# Patient Record
Sex: MALE | Race: WHITE | NOT HISPANIC OR LATINO | Employment: OTHER | URBAN - METROPOLITAN AREA
[De-identification: names, ages, dates, MRNs, and addresses within clinical notes are randomized per-mention and may not be internally consistent; named-entity substitution may affect disease eponyms.]

---

## 2018-04-19 LAB
ALBUMIN SERPL BCP-MCNC: 3.9 G/DL (ref 3.5–5.7)
ALP SERPL-CCNC: 68 IU/L (ref 40–150)
ALT SERPL W P-5'-P-CCNC: 13 IU/L (ref 0–50)
AMYLASE (HISTORICAL): 29 U/L (ref 29–103)
ANION GAP SERPL CALCULATED.3IONS-SCNC: 13.3 MM/L
APTT PPP: 30.2 SEC (ref 24.4–37.6)
AST SERPL W P-5'-P-CCNC: 15 U/L (ref 8–27)
BASOPHILS # BLD AUTO: 0.1 X3/UL (ref 0–0.3)
BASOPHILS # BLD AUTO: 0.9 % (ref 0–2)
BILIRUB SERPL-MCNC: 0.8 MG/DL (ref 0.3–1)
BILIRUB UR QL STRIP: NEGATIVE
BUN SERPL-MCNC: 17 MG/DL (ref 7–25)
CALCIUM SERPL-MCNC: 9.4 MG/DL (ref 8.6–10.5)
CHLORIDE SERPL-SCNC: 99 MM/L (ref 98–107)
CLARITY UR: CLEAR
CO2 SERPL-SCNC: 28 MM/L (ref 21–31)
COLOR UR: ABNORMAL
CREAT SERPL-MCNC: 1.17 MG/DL (ref 0.7–1.3)
DEPRECATED RDW RBC AUTO: 14 % (ref 11.5–14.5)
EGFR (HISTORICAL): > 60 GFR
EGFR AFRICAN AMERICAN (HISTORICAL): > 60 GFR
EOSINOPHIL # BLD AUTO: 0.2 X3/UL (ref 0–0.5)
EOSINOPHIL NFR BLD AUTO: 2.7 % (ref 0–5)
GLUCOSE (HISTORICAL): 282 MG/DL (ref 65–99)
GLUCOSE UR STRIP-MCNC: ABNORMAL MG/DL
HCT VFR BLD AUTO: 45.7 % (ref 42–52)
HGB BLD-MCNC: 15.4 G/DL (ref 14–18)
HGB UR QL STRIP.AUTO: NEGATIVE
INR PPP: 0.98 (ref 0.9–1.5)
KETONES UR STRIP-MCNC: NEGATIVE MG/DL
LEUKOCYTE ESTERASE UR QL STRIP: NEGATIVE
LIPASE SERPL-CCNC: 39 U/L (ref 11–82)
LYMPHOCYTES # BLD AUTO: 1.5 X3/UL (ref 1.2–4.2)
LYMPHOCYTES NFR BLD AUTO: 25.6 % (ref 20.5–51.1)
MCH RBC QN AUTO: 27.1 PG (ref 26–34)
MCHC RBC AUTO-ENTMCNC: 33.6 G/DL (ref 31–36)
MCV RBC AUTO: 80.5 FL (ref 81–99)
MONOCYTES # BLD AUTO: 0.4 X3/UL (ref 0–1)
MONOCYTES NFR BLD AUTO: 7.6 % (ref 1.7–12)
NEUTROPHILS # BLD AUTO: 3.6 X3/UL (ref 1.4–6.5)
NEUTS SEG NFR BLD AUTO: 63.2 % (ref 42.2–75.2)
NITRITE UR QL STRIP: NEGATIVE
OSMOLALITY, SERUM (HISTORICAL): 284 MOSM (ref 262–291)
PH UR STRIP.AUTO: 5.5 [PH] (ref 4.5–8)
PLATELET # BLD AUTO: 193 X3/UL (ref 130–400)
PMV BLD AUTO: 8.5 FL (ref 8.6–11.7)
POTASSIUM SERPL-SCNC: 4.3 MM/L (ref 3.5–5.5)
PROT UR STRIP-MCNC: NEGATIVE MG/DL
PROTHROMBIN TIME (HISTORICAL): 11.4 SEC (ref 10.1–12.9)
RBC # BLD AUTO: 5.68 X6/UL (ref 4.3–5.9)
SODIUM SERPL-SCNC: 136 MM/L (ref 134–143)
SP GR UR STRIP.AUTO: <= 1.005 (ref 1–1.03)
TOTAL PROTEIN (HISTORICAL): 7.1 G/DL (ref 6.4–8.9)
UROBILINOGEN UR QL STRIP.AUTO: 0.2 EU/DL (ref 0.2–8)
WBC # BLD AUTO: 5.7 X3/UL (ref 4.8–10.8)

## 2019-07-22 ENCOUNTER — OFFICE VISIT (OUTPATIENT)
Dept: URGENT CARE | Facility: CLINIC | Age: 54
End: 2019-07-22
Payer: COMMERCIAL

## 2019-07-22 VITALS
TEMPERATURE: 98 F | RESPIRATION RATE: 18 BRPM | HEIGHT: 69 IN | DIASTOLIC BLOOD PRESSURE: 79 MMHG | OXYGEN SATURATION: 97 % | BODY MASS INDEX: 33.33 KG/M2 | HEART RATE: 77 BPM | SYSTOLIC BLOOD PRESSURE: 119 MMHG | WEIGHT: 225 LBS

## 2019-07-22 DIAGNOSIS — T14.8XXA PUNCTURE WOUND: Primary | ICD-10-CM

## 2019-07-22 PROCEDURE — 90715 TDAP VACCINE 7 YRS/> IM: CPT

## 2019-07-22 PROCEDURE — 99203 OFFICE O/P NEW LOW 30 MIN: CPT | Performed by: PHYSICIAN ASSISTANT

## 2019-07-22 RX ORDER — INSULIN GLARGINE 100 [IU]/ML
45 INJECTION, SOLUTION SUBCUTANEOUS DAILY
COMMUNITY

## 2019-07-22 RX ORDER — DOXYCYCLINE 100 MG/1
100 TABLET ORAL 2 TIMES DAILY
Qty: 14 TABLET | Refills: 0 | Status: SHIPPED | OUTPATIENT
Start: 2019-07-22 | End: 2019-07-29

## 2019-07-22 NOTE — PROGRESS NOTES
[facility]        NAME: Bryan Edmondson is a 49 y o  male  : 1965    MRN: [id number]  DATE: 2019  TIME: 11:29 AM    Assessment and Plan   Puncture wound [T14  8XXA]  1  Puncture wound  TDAP Vaccine greater than or equal to 8yo    doxycycline (ADOXA) 100 MG tablet         Patient Instructions     TDAP updated  Due to DM II, will treat with prophylactic abx  Instructed pt to watch for signs of infection and proceed to ED if any redness, warmth, swelling, pain occurs  Follow up with PCP in 3-5 days  Proceed to  ER if symptoms worsen  Chief Complaint     Chief Complaint   Patient presents with    Puncture Wound     stepped on nail today, puncture wound right heeel         History of Present Illness       50 y/o M presents for eval of puncture wound of L heel onset PTA  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 which penetrated his shoe and punctured his heel approx 1/4 inch  He states he is able to ambulate and is only in minor pain  Needs updated tetanus  Review of Systems   Review of Systems   All other systems reviewed and are negative  Current Medications       Current Outpatient Medications:     insulin glargine (LANTUS) 100 units/mL subcutaneous injection, Inject 32 Units under the skin daily at bedtime, Disp: , Rfl:     doxycycline (ADOXA) 100 MG tablet, Take 1 tablet (100 mg total) by mouth 2 (two) times a day for 7 days, Disp: 14 tablet, Rfl: 0    Current Allergies     Allergies as of 2019    (No Known Allergies)            The following portions of the patient's history were reviewed and updated as appropriate: allergies, current medications, past family history, past medical history, past social history, past surgical history and problem list      Past Medical History:   Diagnosis Date    Diabetes mellitus ([facility] [address] )        History reviewed  No pertinent surgical history  No family history on file        Medications have been verified  Objective   /79   Pulse 77   Temp 98 °F (36 7 °C) (Tympanic)   Resp 18   Ht 5' 9" (1 753 m)   Wt 102 kg (225 lb)   SpO2 97%   BMI 33 23 kg/m²        Physical Exam     Physical Exam   Constitutional: He is oriented to person, place, and time  He appears well-developed and well-nourished  No distress  HENT:   Head: Normocephalic and atraumatic  Right Ear: Tympanic membrane, external ear and ear canal normal    Left Ear: Tympanic membrane, external ear and ear canal normal    Nose: Nose normal    Mouth/Throat: Uvula is midline, oropharynx is clear and moist and mucous membranes are normal    Eyes: Pupils are equal, round, and reactive to light  Conjunctivae and EOM are normal    Cardiovascular: Normal rate, regular rhythm and normal heart sounds  Exam reveals no gallop  No murmur heard  Pulmonary/Chest: Effort normal and breath sounds normal  No accessory muscle usage  No respiratory distress  He has no wheezes  He has no rhonchi  He has no rales  Musculoskeletal:   L heel small puncture wound without surrounding erythema  No debris, no foreign body  FROM of all toes and ankle  Sensation intact   Lymphadenopathy:     He has no cervical adenopathy  Neurological: He is alert and oriented to person, place, and time  No cranial nerve deficit  Skin: Skin is warm, dry and intact  No rash noted  Psychiatric: He has a normal mood and affect  Nursing note and vitals reviewed

## 2019-12-03 ENCOUNTER — OFFICE VISIT (OUTPATIENT)
Dept: URGENT CARE | Facility: CLINIC | Age: 54
End: 2019-12-03
Payer: COMMERCIAL

## 2019-12-03 VITALS
RESPIRATION RATE: 20 BRPM | BODY MASS INDEX: 36.56 KG/M2 | OXYGEN SATURATION: 98 % | WEIGHT: 246.8 LBS | HEIGHT: 69 IN | TEMPERATURE: 97.3 F | SYSTOLIC BLOOD PRESSURE: 114 MMHG | DIASTOLIC BLOOD PRESSURE: 69 MMHG | HEART RATE: 89 BPM

## 2019-12-03 DIAGNOSIS — S91.002A ANKLE WOUND, LEFT, INITIAL ENCOUNTER: Primary | ICD-10-CM

## 2019-12-03 PROCEDURE — 99213 OFFICE O/P EST LOW 20 MIN: CPT | Performed by: PHYSICIAN ASSISTANT

## 2019-12-03 RX ORDER — CEPHALEXIN 500 MG/1
500 CAPSULE ORAL 4 TIMES DAILY
Qty: 28 CAPSULE | Refills: 0 | Status: SHIPPED | OUTPATIENT
Start: 2019-12-03 | End: 2019-12-10

## 2019-12-03 NOTE — PATIENT INSTRUCTIONS
Acute Wound Care   AMBULATORY CARE:   An acute wound  is an injury that causes a break in the skin  An acute wound can happen suddenly, last a short time, and may heal on its own  Common signs and symptoms of an acute wound:   · A cut, tear, or gash in your skin    · Bleeding, swelling, pain, or trouble moving the affected area    · Dirt or foreign objects inside the wound     · Milky, yellow, green, or brown pus in the wound     · Red, tender, or warm area around the pus    · Fever  Seek care immediately if:   · You have pus or a foul odor coming from the wound  · You have sudden trouble breathing or chest pain  · Blood soaks through your bandage  Contact your healthcare provider if:   · You have muscle, joint, or body aches, sweating, or a fever  · You have more swelling, redness, or bleeding in your wound  · Your skin is itchy, swollen, or you have a rash  · You have questions or concerns about your condition or care  Treatment for an acute wound  may include any of the following:  · Cleansing  is done with soap and water to wash away germs and decrease the risk of infection  Sterile water further cleans the wound  The cleaning is done under high pressure with a catheter tip and large syringe  A solution that kills germs may also be used  · Debridement  is done to clean and remove objects, dirt, or dead tissues from the open wound  Healthcare providers may also drain the wound to clean out pus  · Closure of the wound  is done with stitches, staples, skin adhesive, or other treatments  This may be done if the wound is wide or deep  Stitches may be needed if the wound is in an area that moves a lot, such as the hands, feet, and joints  Stitches may help to keep the wound from getting infected  They may also decrease the amount of scarring you have  Some wounds may heal better without stitches    Wound care:   · If your wound was closed with thin strips of medical tape, keep them clean and dry  The strips of medical tape will fall off on their own  Do not pull them off  · Keep the bandage clean and dry  Do not remove the bandage over your wound unless your healthcare provider says it is okay  · Wash your hands before and after you take care of your wound to prevent infection  · Clean the wound as directed  If you cannot reach the wound, have someone help you  · If you have packing, make sure all the gauze used to pack the wound is taken out and replaced as directed  Keep track of how many gauze dressings are placed inside the wound  Follow up with your healthcare provider as directed:  Write down your questions so you remember to ask them during your visits  © 2016 [employer] is for End User's use only and may not be sold, redistributed or otherwise used for commercial purposes  All illustrations and images included in CareNotes® are the copyrighted property of A [employer] M [employer]  or [employer] R[employer]  The above information is an  only  It is not intended as medical advice for individual conditions or treatments  Talk to your doctor, nurse or pharmacist before following any medical regimen to see if it is safe and effective for you

## 2019-12-03 NOTE — PROGRESS NOTES
[facility]        NAME: Desiree Kinsey is a 49 y o  male  : 1965    MRN: [id number]  DATE: December 3, 2019  TIME: 10:31 AM    Assessment and Plan   Ankle wound, left, initial encounter [S91 002A]  1  Ankle wound, left, initial encounter  cephalexin (KEFLEX) 500 mg capsule         Patient Instructions     Start Keflex as prescribed  Follow up with PCP tomorrow as scheduled for referral to wound care  Proceed to  ER if symptoms worsen  Chief Complaint     Chief Complaint   Patient presents with    Leg Injury     c/o left lower leg injury, pt struck his leg on a push cart last Tuesday  Open wound with redness noted to LLE  History of Present Illness       Patient presents with a one-week history of wound to the left anterior ankle  Patient states he may have struck his foot with a cart but is unsure  The wound is not healing and has increased of redness surrounding the wound  Denies any drainage from the wound, fever or chills  Patient is diabetic  Review of Systems   Review of Systems   Constitutional: Negative for chills and fever  Gastrointestinal: Negative for nausea and vomiting  Musculoskeletal: Positive for myalgias  Skin: Positive for wound  Neurological: Negative for weakness and numbness  Hematological: Negative for adenopathy           Current Medications       Current Outpatient Medications:     insulin glargine (LANTUS) 100 units/mL subcutaneous injection, Inject 32 Units under the skin daily at bedtime, Disp: , Rfl:     LISINOPRIL PO, Take by mouth, Disp: , Rfl:     metFORMIN (GLUCOPHAGE) 500 mg tablet, Take 500 mg by mouth 2 (two) times a day with meals, Disp: , Rfl:     cephalexin (KEFLEX) 500 mg capsule, Take 1 capsule (500 mg total) by mouth 4 (four) times a day for 7 days, Disp: 28 capsule, Rfl: 0    Current Allergies     Allergies as of 2019    (No Known Allergies)            The following portions of the patient's history were reviewed and updated as appropriate: allergies, current medications, past family history, past medical history, past social history, past surgical history and problem list      Past Medical History:   Diagnosis Date    Diabetes mellitus ([facility] [address] )        History reviewed  No pertinent surgical history  History reviewed  No pertinent family history  Medications have been verified  Objective   /69 (BP Location: Left arm, Patient Position: Sitting, Cuff Size: Extra-Large)   Pulse 89   Temp (!) 97 3 °F (36 3 °C) (Tympanic)   Resp 20   Ht 5' 9" (1 753 m)   Wt 112 kg (246 lb 12 8 oz)   SpO2 98%   BMI 36 45 kg/m²        Physical Exam     Physical Exam   Constitutional: He is oriented to person, place, and time  He appears well-developed and well-nourished  HENT:   Head: Normocephalic and atraumatic  Neck: Normal range of motion  Cardiovascular: Normal rate and regular rhythm  Pulmonary/Chest: Effort normal    Neurological: He is alert and oriented to person, place, and time  Skin: Skin is warm and dry  4 5 cm wound left anterior ankle which appears to be full skin thickness  Wound is dry but there is a lot of surrounding erythema and tenderness  There is slight erythema extending into the dorsum of the foot  Psychiatric: He has a normal mood and affect  His behavior is normal    Nursing note and vitals reviewed

## 2020-07-30 ENCOUNTER — OFFICE VISIT (OUTPATIENT)
Dept: URGENT CARE | Facility: CLINIC | Age: 55
End: 2020-07-30
Payer: COMMERCIAL

## 2020-07-30 VITALS
OXYGEN SATURATION: 99 % | DIASTOLIC BLOOD PRESSURE: 73 MMHG | RESPIRATION RATE: 18 BRPM | BODY MASS INDEX: 36.43 KG/M2 | WEIGHT: 246 LBS | SYSTOLIC BLOOD PRESSURE: 148 MMHG | HEART RATE: 88 BPM | HEIGHT: 69 IN | TEMPERATURE: 98 F

## 2020-07-30 DIAGNOSIS — L72.3 INFLAMED SEBACEOUS CYST: Primary | ICD-10-CM

## 2020-07-30 PROCEDURE — S9088 SERVICES PROVIDED IN URGENT: HCPCS | Performed by: PHYSICIAN ASSISTANT

## 2020-07-30 PROCEDURE — 99213 OFFICE O/P EST LOW 20 MIN: CPT | Performed by: PHYSICIAN ASSISTANT

## 2020-07-30 PROCEDURE — 87070 CULTURE OTHR SPECIMN AEROBIC: CPT | Performed by: PHYSICIAN ASSISTANT

## 2020-07-30 PROCEDURE — 10060 I&D ABSCESS SIMPLE/SINGLE: CPT | Performed by: PHYSICIAN ASSISTANT

## 2020-07-30 PROCEDURE — 87205 SMEAR GRAM STAIN: CPT | Performed by: PHYSICIAN ASSISTANT

## 2020-07-30 RX ORDER — PIOGLITAZONEHYDROCHLORIDE 30 MG/1
30 TABLET ORAL DAILY
COMMUNITY
Start: 2020-06-17 | End: 2022-03-03

## 2020-07-30 NOTE — PATIENT INSTRUCTIONS
Epidermal Inclusion Cysts   WHAT YOU NEED TO KNOW:   Epidermal inclusion cysts are the most common skin cysts in adults  These cysts are usually round, firm lumps filled with a cheese-like material called keratin  They are also called epidermoid, keratin, or sebaceous cysts  They can be found almost anywhere on your body  The cysts are most common on the face, back, neck, chest, and around your ears  They can be caused by blocked hair follicle and oil gland ducts in your skin  Epidermal inclusion cysts may grow slowly but are not cancerous  DISCHARGE INSTRUCTIONS:   Follow up with your healthcare provider as directed:  Write down your questions so you remember to ask them at your visits  Medicines:   · Antibiotics  may be given to treat or prevent an infection  · Take your medicine as directed  Contact your healthcare provider if you think your medicine is not helping or if you have side effects  Tell him of her if you are allergic to any medicine  Keep a list of the medicines, vitamins, and herbs you take  Include the amounts, and when and why you take them  Bring the list or the pill bottles to follow-up visits  Carry your medicine list with you in case of an emergency  Contact your healthcare provider if:   · Your cyst becomes swollen, red, and painful  · Your cyst is large and leads to trouble moving or a deformed area  · You have questions or concerns about your condition or care  © 2017 [facility] Information is for End User's use only and may not be sold, redistributed or otherwise used for commercial purposes  All illustrations and images included in CareNotes® are the copyrighted property of [employer] M , Inc  or Kirk Rivas  The above information is an  only  It is not intended as medical advice for individual conditions or treatments   Talk to your doctor, nurse or pharmacist before following any medical regimen to see if it is safe and effective for you

## 2020-07-30 NOTE — PROGRESS NOTES
[facility]        NAME: Violet Ashley is a 48 y o  male  : 1965    MRN: [id number]  DATE: 2020  TIME: 12:32 PM    Assessment and Plan   Inflamed sebaceous cyst [L72 3]  1  Inflamed sebaceous cyst  Wound culture and Gram stain         Patient Instructions       Follow up with PCP in 3-5 days  Proceed to  ER if symptoms worsen  Chief Complaint     Chief Complaint   Patient presents with    Mass     has cyst on back for many years, became red and inflamed the last 2 days         History of Present Illness       Patient states he has a cyst on his back which has now become red and painful with past 2 days  He has had numerous cyst removed in the past   Denies any fever chills or drainage  Review of Systems   Review of Systems   Constitutional: Negative for chills and fever  Skin: Positive for wound  Hematological: Negative for adenopathy  Current Medications       Current Outpatient Medications:     insulin glargine (LANTUS) 100 units/mL subcutaneous injection, Inject 32 Units under the skin daily at bedtime, Disp: , Rfl:     LISINOPRIL PO, Take by mouth, Disp: , Rfl:     metFORMIN (GLUCOPHAGE) 500 mg tablet, Take 500 mg by mouth 2 (two) times a day with meals, Disp: , Rfl:     pioglitazone (ACTOS) 30 mg tablet, Take 30 mg by mouth daily, Disp: , Rfl:     Current Allergies     Allergies as of 2020    (No Known Allergies)            The following portions of the patient's history were reviewed and updated as appropriate: allergies, current medications, past family history, past medical history, past social history, past surgical history and problem list      Past Medical History:   Diagnosis Date    Diabetes mellitus ([facility] [address] )        History reviewed  No pertinent surgical history  History reviewed  No pertinent family history  Medications have been verified          Objective   /73   Pulse 88   Temp 98 °F (36 7 °C) (Tympanic)   Resp 18   Ht 5' 9" (1 753 m)   Wt 112 kg (246 lb)   SpO2 99%   BMI 36 33 kg/m²          Physical Exam     Physical Exam   Constitutional: He appears well-developed and well-nourished  HENT:   Head: Normocephalic and atraumatic  Cardiovascular: Normal rate and regular rhythm  Pulmonary/Chest: Effort normal    Neurological: He is alert  Skin: Skin is warm  2 cm area of erythema of the upper thoracic area the back  There is a central area of fluctuance  There is slight tenderness  No drainage  Psychiatric: He has a normal mood and affect  Nursing note and vitals reviewed  Incision and drain  Date/Time: 7/30/2020 12:29 PM  Performed by: Cherry Diaz PA-C  Authorized by: Cherry Diaz PA-C     Patient location: care now  Other Assisting Provider: Yes (comment) (nurse)    Consent:     Consent obtained:  Verbal    Consent given by:  Patient    Risks discussed:  Incomplete drainage, pain and bleeding    Alternatives discussed:  No treatment  Universal protocol:     Procedure explained and questions answered to patient or proxy's satisfaction: yes      Patient identity confirmed:  Verbally with patient  Location:     Type:  Cyst    Size:  2    Location:  Trunk    Trunk location:  Back  Pre-procedure details:     Skin preparation:  Betadine  Anesthesia (see MAR for exact dosages): Anesthesia method:  Local infiltration    Local anesthetic:  Lidocaine 1% w/o epi  Procedure details:     Complexity:  Simple    Needle aspiration: no      Incision types:  Stab incision    Scalpel blade:  11    Approach:  Puncture    Incision depth:  Subcutaneous    Wound management:  Probed and deloculated    Drainage:  Purulent    Drainage amount: Moderate    Wound treatment:  Wound left open    Packing materials:  None  Post-procedure details:     Patient tolerance of procedure:   Tolerated well, no immediate complications

## 2020-08-04 LAB
BACTERIA WND AEROBE CULT: ABNORMAL
GRAM STN SPEC: ABNORMAL

## 2021-03-23 ENCOUNTER — OFFICE VISIT (OUTPATIENT)
Dept: OBGYN CLINIC | Facility: CLINIC | Age: 56
End: 2021-03-23
Payer: COMMERCIAL

## 2021-03-23 ENCOUNTER — APPOINTMENT (OUTPATIENT)
Dept: RADIOLOGY | Facility: CLINIC | Age: 56
End: 2021-03-23
Payer: COMMERCIAL

## 2021-03-23 VITALS
SYSTOLIC BLOOD PRESSURE: 126 MMHG | WEIGHT: 259 LBS | DIASTOLIC BLOOD PRESSURE: 86 MMHG | HEART RATE: 68 BPM | HEIGHT: 69 IN | BODY MASS INDEX: 38.36 KG/M2

## 2021-03-23 DIAGNOSIS — M25.512 LEFT SHOULDER PAIN, UNSPECIFIED CHRONICITY: ICD-10-CM

## 2021-03-23 DIAGNOSIS — M25.512 LEFT SHOULDER PAIN, UNSPECIFIED CHRONICITY: Primary | ICD-10-CM

## 2021-03-23 DIAGNOSIS — M75.122 COMPLETE TEAR OF LEFT ROTATOR CUFF, UNSPECIFIED WHETHER TRAUMATIC: ICD-10-CM

## 2021-03-23 PROCEDURE — 99203 OFFICE O/P NEW LOW 30 MIN: CPT | Performed by: ORTHOPAEDIC SURGERY

## 2021-03-23 PROCEDURE — 73030 X-RAY EXAM OF SHOULDER: CPT

## 2021-03-23 NOTE — PROGRESS NOTES
Assessment/Plan:    No problem-specific Assessment & Plan notes found for this encounter  Diagnoses and all orders for this visit:    Left shoulder pain, unspecified chronicity  -     XR shoulder 2+ vw left; Future  -     MRI shoulder left wo contrast; Future    Complete tear of left rotator cuff, unspecified whether traumatic  -     MRI shoulder left wo contrast; Future           an MRI is ordered of his left shoulder to rule out a rotator cuff tear  Continue home exercise program   Continue stretching  Follow up once the MRI is complete  An injection was declined today  Subjective:      Patient ID: Fran Monsalve is a 48 y o  male  HPI      The patient has had progressive pain and left shoulder for the past several months if not years  He then stated he has had in issues with his left shoulder while playing basketball  He states of recent, he has trouble extending and abducting his arm past 90 degrees  He denies any numbness or tingling  He denies any neck pain  He has not had any x-rays    The following portions of the patient's history were reviewed and updated as appropriate: allergies, current medications, past family history, past medical history, past social history, past surgical history and problem list     Review of Systems   Constitutional: Negative for chills, fever and unexpected weight change  HENT: Negative for hearing loss, nosebleeds and sore throat  Eyes: Negative for pain, redness and visual disturbance  Respiratory: Negative for cough, shortness of breath and wheezing  Cardiovascular: Negative for chest pain, palpitations and leg swelling  Gastrointestinal: Negative for abdominal pain, nausea and vomiting  Endocrine: Negative for polydipsia and polyuria  Genitourinary: Negative for dysuria and hematuria  Musculoskeletal: Positive for arthralgias and myalgias  Negative for back pain, gait problem, joint swelling, neck pain and neck stiffness          As noted in HPI   Skin: Negative for rash and wound  Neurological: Negative for dizziness, numbness and headaches  Psychiatric/Behavioral: Negative for decreased concentration and suicidal ideas  The patient is not nervous/anxious  Objective:      /86 (BP Location: Right arm, Patient Position: Sitting, Cuff Size: Large)   Pulse 68   Ht 5' 9" (1 753 m)   Wt 117 kg (259 lb)   BMI 38 25 kg/m²          Physical Exam       neck was soft and supple  There is a negative axial compression test in his neck  Left upper extremity is neurovascular intact  Fingers are pink and mobile  Compartments are soft  There is limited range of motion along his left shoulder with 90 degrees of forward flexion and abduction  Internal rotation to the sacrum  There is a positive drop-arm test   There is mild signs of impingement  No gross instability  Rotator cuff strength testing was 3-1/2 to 4/5        X-rays show subacromial spur

## 2021-03-30 ENCOUNTER — HOSPITAL ENCOUNTER (OUTPATIENT)
Dept: MRI IMAGING | Facility: HOSPITAL | Age: 56
Discharge: HOME | End: 2021-03-30
Attending: ORTHOPAEDIC SURGERY
Payer: COMMERCIAL

## 2021-03-30 DIAGNOSIS — M75.122 COMPLETE TEAR OF LEFT ROTATOR CUFF, UNSPECIFIED WHETHER TRAUMATIC: ICD-10-CM

## 2021-03-30 DIAGNOSIS — M25.512 LEFT SHOULDER PAIN, UNSPECIFIED CHRONICITY: ICD-10-CM

## 2021-03-30 PROCEDURE — 73221 MRI JOINT UPR EXTREM W/O DYE: CPT

## 2021-03-30 PROCEDURE — G1004 CDSM NDSC: HCPCS

## 2021-04-09 ENCOUNTER — OFFICE VISIT (OUTPATIENT)
Dept: OBGYN CLINIC | Facility: CLINIC | Age: 56
End: 2021-04-09
Payer: COMMERCIAL

## 2021-04-09 VITALS
SYSTOLIC BLOOD PRESSURE: 121 MMHG | BODY MASS INDEX: 38.36 KG/M2 | DIASTOLIC BLOOD PRESSURE: 78 MMHG | HEART RATE: 58 BPM | HEIGHT: 69 IN | WEIGHT: 259 LBS

## 2021-04-09 DIAGNOSIS — M25.512 LEFT SHOULDER PAIN, UNSPECIFIED CHRONICITY: ICD-10-CM

## 2021-04-09 DIAGNOSIS — M75.42 IMPINGEMENT SYNDROME OF LEFT SHOULDER: Primary | ICD-10-CM

## 2021-04-09 PROCEDURE — 99213 OFFICE O/P EST LOW 20 MIN: CPT | Performed by: ORTHOPAEDIC SURGERY

## 2021-04-09 PROCEDURE — 20610 DRAIN/INJ JOINT/BURSA W/O US: CPT | Performed by: ORTHOPAEDIC SURGERY

## 2021-04-09 RX ORDER — METHYLPREDNISOLONE ACETATE 40 MG/ML
2 INJECTION, SUSPENSION INTRA-ARTICULAR; INTRALESIONAL; INTRAMUSCULAR; SOFT TISSUE
Status: COMPLETED | OUTPATIENT
Start: 2021-04-09 | End: 2021-04-09

## 2021-04-09 RX ORDER — BUPIVACAINE HYDROCHLORIDE 2.5 MG/ML
4 INJECTION, SOLUTION INFILTRATION; PERINEURAL
Status: COMPLETED | OUTPATIENT
Start: 2021-04-09 | End: 2021-04-09

## 2021-04-09 RX ADMIN — METHYLPREDNISOLONE ACETATE 2 ML: 40 INJECTION, SUSPENSION INTRA-ARTICULAR; INTRALESIONAL; INTRAMUSCULAR; SOFT TISSUE at 08:37

## 2021-04-09 RX ADMIN — BUPIVACAINE HYDROCHLORIDE 4 ML: 2.5 INJECTION, SOLUTION INFILTRATION; PERINEURAL at 08:37

## 2021-04-09 NOTE — PROGRESS NOTES
ASSESSMENT/PLAN:    Diagnoses and all orders for this visit:    Impingement syndrome of left shoulder    Left shoulder pain, unspecified chronicity        48 y o  male with left shoulder impingement syndrome  Patient was offered a corticosteroid injection about the left shoulder  Risks and benefits of the injection were discussed at length  I discussed that the injection would be for both diagnostic and therapeutic purposes to determine if most of his discomfort is stemming from the bone spur  Patient tolerated the injection well  I will see him back in office in 6 weeks for a re-evaluation of the left shoulder  the patient presents for his MRI report of her left shoulder  Fortunately the rotator cuff was intact  He still complaining pain along the anterior aspect of his left shoulder  Physical exam shows forward flexion abduction to 110°  There is mild signs of impingement  No gross instability  Rotator cuff strength testing was intact  MRI results were discussed  Under aseptic technique, the left shoulder was injected with Depo-Medrol and Marcaine  He tolerated procedure quite well  Return back in 6 weeks for re-evaluation  If there are any problems sooner, he will not hesitate to let us now    Return in about 6 weeks (around 5/21/2021) for Recheck left shoulder        _____________________________________________________  CHIEF COMPLAINT:  Chief Complaint   Patient presents with    Left Shoulder - Follow-up         SUBJECTIVE:  Olga Medina is a 48 y o  male who presents to the office today for a follow up of his left shoulder pain  Patient received an MRI on 3/30/2021  Patient stated that he has pain with shoulder flexion past 90 degrees  Patient denied any numbness or tingling  Patient denied any new incidence of injury         The following portions of the patient's history were reviewed and updated as appropriate: allergies, current medications, past family history, past medical history, past social history, past surgical history and problem list     PAST MEDICAL HISTORY:  Past Medical History:   Diagnosis Date    Diabetes mellitus ([facility] [address] )        PAST SURGICAL HISTORY:  History reviewed  No pertinent surgical history  FAMILY HISTORY:  History reviewed  No pertinent family history  SOCIAL HISTORY:  Social History     Tobacco Use    Smoking status: Never Smoker    Smokeless tobacco: Never Used   Substance Use Topics    Alcohol use: Not Currently    Drug use: Never       MEDICATIONS:    Current Outpatient Medications:     insulin glargine (LANTUS) 100 units/mL subcutaneous injection, Inject 32 Units under the skin daily at bedtime, Disp: , Rfl:     LISINOPRIL PO, Take by mouth, Disp: , Rfl:     metFORMIN (GLUCOPHAGE) 500 mg tablet, Take 500 mg by mouth 2 (two) times a day with meals, Disp: , Rfl:     pioglitazone (ACTOS) 30 mg tablet, Take 30 mg by mouth daily, Disp: , Rfl:     ALLERGIES:  No Known Allergies    ROS:  Review of Systems   Constitutional: Negative for chills and fever  HENT: Negative for ear pain and sore throat  Eyes: Negative for pain and visual disturbance  Respiratory: Negative for cough and shortness of breath  Cardiovascular: Negative for chest pain and palpitations  Gastrointestinal: Negative for abdominal pain and vomiting  Genitourinary: Negative for dysuria and hematuria  Musculoskeletal: Positive for arthralgias  Negative for back pain  Skin: Negative for color change and rash  Neurological: Negative for seizures and syncope  All other systems reviewed and are negative  Constitutional: Negative for fatigue, fever or loss of appetite  HENT: Negative  Respiratory: Negative for shortness of breath, dyspnea  Cardiovascular: Negative for chest pain/tightness  Gastrointestinal: Negative for abdominal pain, N/V  Endocrine: Negative for cold/heat intolerance, unexplained weight loss/gain     Genitourinary: Negative for flank pain, dysuria, hematuria  Musculoskeletal: Positive for arthralgia   Skin: Negative for rash  Neurological: Negative for numbness or tingling  Psychiatric/Behavioral: Negative for agitation  _____________________________________________________  PHYSICAL EXAMINATION:    Blood pressure 121/78, pulse 58, height 5' 9" (1 753 m), weight 117 kg (259 lb)  Constitutional: Oriented to person, place, and time  Appears well-developed and well-nourished  No distress  HENT:   Head: Normocephalic  Eyes: Conjunctivae are normal  Right eye exhibits no discharge  Left eye exhibits no discharge  No scleral icterus  Cardiovascular: Normal rate  Pulmonary/Chest: Effort normal    Neurological: Alert and oriented to person, place, and time  Skin: Skin is warm and dry  No rash noted  Not diaphoretic  No erythema  No pallor  Psychiatric: Normal mood and affect  Behavior is normal  Judgment and thought content normal       MUSCULOSKELETAL EXAMINATION:   Physical Exam  Ortho Exam    Left Shoulder:   Skin intact   No obvious swelling   No erythema or ecchymosis   Compartments soft  Fingers pink and mobile   Limited ROM of the shoulder past 90 degrees of forward flexion   No gross instability   Neurovascularly intact     MRI of the left shoulder performed on 3/30/2021 demonstrates mild tendinosis of the long head of the biceps tendon, mild supraspinatus and infraspinatus tendinosis and subcortical cysts superiorly at the glenoid  Objective:  BP Readings from Last 1 Encounters:   04/09/21 121/78      Wt Readings from Last 1 Encounters:   04/09/21 117 kg (259 lb)        BMI:   Estimated body mass index is 38 25 kg/m² as calculated from the following:    Height as of this encounter: 5' 9" (1 753 m)  Weight as of this encounter: 117 kg (259 lb)  PROCEDURES PERFORMED:  Large joint arthrocentesis: L glenohumeral  Universal Protocol:  Consent: Verbal consent obtained    Risks and benefits: risks, benefits and alternatives were discussed  Consent given by: patient  Time out: Immediately prior to procedure a "time out" was called to verify the correct patient, procedure, equipment, support staff and site/side marked as required    Timeout called at: 4/9/2021 8:30 AM   Patient understanding: patient states understanding of the procedure being performed  Site marked: the operative site was marked  Patient identity confirmed: verbally with patient    Supporting Documentation  Indications: pain   Procedure Details  Location: shoulder - L glenohumeral  Preparation: Patient was prepped and draped in the usual sterile fashion  Needle size: 22 G  Ultrasound guidance: no  Approach: lateral  Medications administered: 2 mL methylPREDNISolone acetate 40 mg/mL; 4 mL bupivacaine 0 25 %    Patient tolerance: patient tolerated the procedure well with no immediate complications  Dressing:  Sterile dressing applied              Scribe Attestation    I,:  Sabrina Saunders am acting as a scribe while in the presence of the attending physician :       I,:  Jayden Martinez DO personally performed the services described in this documentation    as scribed in my presence :

## 2021-05-25 ENCOUNTER — OFFICE VISIT (OUTPATIENT)
Dept: OBGYN CLINIC | Facility: CLINIC | Age: 56
End: 2021-05-25
Payer: COMMERCIAL

## 2021-05-25 VITALS — BODY MASS INDEX: 38.36 KG/M2 | HEIGHT: 69 IN | WEIGHT: 259 LBS

## 2021-05-25 DIAGNOSIS — M75.42 IMPINGEMENT SYNDROME OF LEFT SHOULDER: Primary | ICD-10-CM

## 2021-05-25 PROCEDURE — 99213 OFFICE O/P EST LOW 20 MIN: CPT | Performed by: ORTHOPAEDIC SURGERY

## 2021-05-25 NOTE — PROGRESS NOTES
Assessment/Plan:    No problem-specific Assessment & Plan notes found for this encounter  Diagnoses and all orders for this visit:    Impingement syndrome of left shoulder           the patient is less symptomatic at this time  Additional injections and surgery were discussed but declined  He was instructed to continue home exercise program and stretch  Return back in 3 months for strength and motion check  If his condition changes, he will not hesitate to let us know    Subjective:      Patient ID: Monique Patel is a 55 y o  male  HPI     the patient has history of impingement of his left shoulder  He did have an injection the past   He states his pain has decreased albeit still present  He is sleeping better at night  He denies any numbness or tingling  He denies any fever chills  He denies any neck pain  The following portions of the patient's history were reviewed and updated as appropriate: allergies, current medications, past family history, past medical history, past social history, past surgical history and problem list     Review of Systems   Constitutional: Negative for chills, fever and unexpected weight change  HENT: Negative for hearing loss, nosebleeds and sore throat  Eyes: Negative for pain, redness and visual disturbance  Respiratory: Negative for cough, shortness of breath and wheezing  Cardiovascular: Negative for chest pain, palpitations and leg swelling  Gastrointestinal: Negative for abdominal pain, nausea and vomiting  Endocrine: Negative for polydipsia and polyuria  Genitourinary: Negative for dysuria and hematuria  Musculoskeletal: Positive for arthralgias and myalgias  Negative for back pain, gait problem, joint swelling, neck pain and neck stiffness  As noted in HPI   Skin: Negative for rash and wound  Neurological: Negative for dizziness, numbness and headaches     Psychiatric/Behavioral: Negative for decreased concentration and suicidal ideas  The patient is not nervous/anxious  Objective:      Ht 5' 9" (1 753 m)   Wt 117 kg (259 lb)   BMI 38 25 kg/m²          Physical Exam        Neck was soft and supple  There is a negative axial compression test is neck  Left upper extremity was neurovascular intact  Fingers are pink and mobile  Compartments are soft  Range of motion of his shoulder was improved 140° of flexion and abduction  Internal rotation to just above the level of the sacrum  There is minimal signs of impingement  No gross instability  Rotator cuff strength testing is grossly intact  A negative drop-arm test is present    Neurologically intact distally

## 2021-08-17 ENCOUNTER — PREP FOR PROCEDURE (OUTPATIENT)
Dept: OBGYN CLINIC | Facility: CLINIC | Age: 56
End: 2021-08-17

## 2021-08-17 ENCOUNTER — OFFICE VISIT (OUTPATIENT)
Dept: OBGYN CLINIC | Facility: CLINIC | Age: 56
End: 2021-08-17
Payer: COMMERCIAL

## 2021-08-17 VITALS
BODY MASS INDEX: 37.77 KG/M2 | SYSTOLIC BLOOD PRESSURE: 120 MMHG | HEIGHT: 69 IN | HEART RATE: 73 BPM | DIASTOLIC BLOOD PRESSURE: 84 MMHG | WEIGHT: 255 LBS

## 2021-08-17 DIAGNOSIS — M75.82 BONE SPUR OF ACROMIOCLAVICULAR JOINT, LEFT: ICD-10-CM

## 2021-08-17 DIAGNOSIS — M75.42 IMPINGEMENT SYNDROME OF LEFT SHOULDER: Primary | ICD-10-CM

## 2021-08-17 DIAGNOSIS — Z01.812 PRE-OPERATIVE LABORATORY EXAMINATION: ICD-10-CM

## 2021-08-17 PROCEDURE — 99213 OFFICE O/P EST LOW 20 MIN: CPT | Performed by: ORTHOPAEDIC SURGERY

## 2021-08-17 RX ORDER — CEFAZOLIN SODIUM 2 G/50ML
2000 SOLUTION INTRAVENOUS ONCE
Status: CANCELLED | OUTPATIENT
Start: 2022-04-20 | End: 2021-08-17

## 2021-08-17 RX ORDER — CHLORHEXIDINE GLUCONATE 4 G/100ML
SOLUTION TOPICAL DAILY PRN
Status: CANCELLED | OUTPATIENT
Start: 2021-08-17

## 2021-08-17 NOTE — PROGRESS NOTES
Assessment/Plan:  Assessment/Plan   Diagnoses and all orders for this visit:    Impingement syndrome of left shoulder    Bone spur of acromioclavicular joint, left       Discussed with patient that today's physical exam is consistent with rotator cuff impingement  Previous imaging demonstrates positive subacromial spur  Reviewed prognosis, procedure, benefits, and risks of left shoulder arthroscopy for subacromial decompression  Patient expresses understanding and detailed consent was obtained at time of visit for left shoulder arthroscopy  Surgery is tentatively scheduled for 1/5/2022  He will be seen postoperatively for follow-up  treatment options were discussed with the patient in great detail  He has opted for elective arthroscopy was left shoulder with possible open decompression and repair of knee would arise  All risks, complications, benefits were discussed with the patient in great detail including bleeding, infection, blood clots, pain, stiffness, neurovascular damage, fractures, dislocations, possibility of loss of life or limb for surgery, heart attack, stroke, etcetera  If there is a large rotator cuff tear which can not be repaired, he may have to live with a rotator cuff deficient shoulder  His surgery scheduled for January 5, 2022  Physical examination shows both primary secondary signs of impingement  Rotator cuff strength testing is grossly intact  There is a painful arc of motion along the anterior aspect of his left shoulder    Subjective:   Patient ID: Martha Franco is a 55 y o  male  HPI   patient returns for evaluation of left shoulder impingement  He was last seen in regards to this issue on 5/25/2021, at which time he was 6 weeks s/p corticosteroid injection  He states that he had no relief following the injection  He has been able to return to most activities of daily living, but continues to have symptom exacerbation with overhead motion    On today's presentation he reports feelings of crepitus and anterior shoulder pain  He denies any associated bruising, swelling, numbness, or tingling  He does report occasional feelings of weakness, but notes that this is mostly due to pain inhibition  As he had not had significant relief following the injection, he desires to discuss surgical options today  The following portions of the patient's history were reviewed and updated as appropriate: allergies, current medications, past family history, past medical history, past social history, past surgical history and problem list     Past Medical History:   Diagnosis Date    Diabetes mellitus ([facility] [address] )      History reviewed  No pertinent surgical history  History reviewed  No pertinent family history  Social History     Socioeconomic History    Marital status: /Civil Union     Spouse name: None    Number of children: None    Years of education: None    Highest education level: None   Occupational History    None   Tobacco Use    Smoking status: Never Smoker    Smokeless tobacco: Never Used   Vaping Use    Vaping Use: Never used   Substance and Sexual Activity    Alcohol use: Not Currently    Drug use: Never    Sexual activity: None   Other Topics Concern    None   Social History Narrative    None     Social Determinants of Health     Financial Resource Strain:     Difficulty of Paying Living Expenses:    Food Insecurity:     Worried About Running Out of Food in the Last Year:     Ran Out of Food in the Last Year:    Transportation Needs:     Lack of Transportation (Medical):      Lack of Transportation (Non-Medical):    Physical Activity:     Days of Exercise per Week:     Minutes of Exercise per Session:    Stress:     Feeling of Stress :    Social Connections:     Frequency of Communication with Friends and Family:     Frequency of Social Gatherings with Friends and Family:     Attends Mosque Services:     Active Member of Clubs or Organizations:     Attends Club or Organization Meetings:     Marital Status:    Intimate Partner Violence:     Fear of Current or Ex-Partner:     Emotionally Abused:     Physically Abused:     Sexually Abused:        Current Outpatient Medications:     insulin glargine (LANTUS) 100 units/mL subcutaneous injection, Inject 32 Units under the skin daily at bedtime, Disp: , Rfl:     LISINOPRIL PO, Take by mouth, Disp: , Rfl:     metFORMIN (GLUCOPHAGE) 500 mg tablet, Take 500 mg by mouth 2 (two) times a day with meals, Disp: , Rfl:     pioglitazone (ACTOS) 30 mg tablet, Take 30 mg by mouth daily, Disp: , Rfl:     No Known Allergies      Review of Systems   Constitutional: Negative for chills, fever and unexpected weight change  HENT: Negative for hearing loss, nosebleeds and sore throat  Eyes: Negative for pain, redness and visual disturbance  Respiratory: Negative for cough, shortness of breath and wheezing  Cardiovascular: Negative for chest pain, palpitations and leg swelling  Gastrointestinal: Negative for abdominal pain, nausea and vomiting  Endocrine: Negative for polydipsia and polyuria  Genitourinary: Negative for dysuria and hematuria  Musculoskeletal:        As noted in HPI   Skin: Negative for rash and wound  Neurological: Negative for dizziness, numbness and headaches  Psychiatric/Behavioral: Negative for decreased concentration and suicidal ideas  The patient is not nervous/anxious          Objective:  /84   Pulse 73   Ht 5' 9" (1 753 m)   Wt 116 kg (255 lb)   BMI 37 66 kg/m²     Ortho Exam   Left Shoulder -   No anatomical deformity  Skin is warm and dry to touch with no signs of erythema, ecchymosis, or infection  No soft tissue swelling or effusion noted  + palpable crepitus with passive motion  TTP over anterior acromian  ROM  FF 0-165, ABD 0-165, ER 0-80  MMT 4 5/5 rotator cuff strength  No glenohumeral instability appreciated on exam  +  Speed's  +  Hawkin's  - Broadwater's  -   Empty can  Demonstrates normal elbow, wrist, and finger motion  2+ distal radial pulse with brisk capillary refill to the fingers  Radial, median, and ulnar motor and sensory distributions intact  Sensation light touch intact distally      Physical Exam  Constitutional:       Appearance: He is well-developed  HENT:      Right Ear: External ear normal       Left Ear: External ear normal       Nose: Nose normal    Eyes:      Conjunctiva/sclera: Conjunctivae normal       Pupils: Pupils are equal, round, and reactive to light  Pulmonary:      Effort: Pulmonary effort is normal    Musculoskeletal:      Cervical back: Normal range of motion  Comments:  As noted in HPI   Skin:     General: Skin is warm and dry  Neurological:      Mental Status: He is alert and oriented to person, place, and time  Psychiatric:         Behavior: Behavior normal          Thought Content:  Thought content normal          Judgment: Judgment normal          Imaging:  No new imaging performed this visit    Scribe Attestation    I,:  Sherice Love am acting as a scribe while in the presence of the attending physician :       I,:  Alee Zimmerman DO personally performed the services described in this documentation    as scribed in my presence :

## 2021-08-18 ENCOUNTER — PREP FOR PROCEDURE (OUTPATIENT)
Dept: OBGYN CLINIC | Facility: CLINIC | Age: 56
End: 2021-08-18

## 2021-12-28 ENCOUNTER — TELEPHONE (OUTPATIENT)
Dept: OBGYN CLINIC | Facility: HOSPITAL | Age: 56
End: 2021-12-28

## 2021-12-29 ENCOUNTER — PREP FOR PROCEDURE (OUTPATIENT)
Dept: OBGYN CLINIC | Facility: CLINIC | Age: 56
End: 2021-12-29

## 2021-12-29 ENCOUNTER — TELEPHONE (OUTPATIENT)
Dept: OBGYN CLINIC | Facility: MEDICAL CENTER | Age: 56
End: 2021-12-29

## 2021-12-29 NOTE — PRE-PROCEDURE INSTRUCTIONS
Pre-Surgery Instructions:   Medication Instructions    insulin glargine (LANTUS) 100 units/mL subcutaneous injection Instructed patient per Anesthesia Guidelines   LISINOPRIL PO Instructed patient per Anesthesia Guidelines   metFORMIN (GLUCOPHAGE) 500 mg tablet Instructed patient per Anesthesia Guidelines   pioglitazone (ACTOS) 30 mg tablet Instructed patient per Anesthesia Guidelines  Instructed to take lantus insulin am of surgery per anesthesia guidelines

## 2022-01-03 ENCOUNTER — TELEPHONE (OUTPATIENT)
Dept: OBGYN CLINIC | Facility: CLINIC | Age: 57
End: 2022-01-03

## 2022-01-03 ENCOUNTER — TELEPHONE (OUTPATIENT)
Dept: OBGYN CLINIC | Facility: OTHER | Age: 57
End: 2022-01-03

## 2022-01-03 NOTE — TELEPHONE ENCOUNTER
Patient called in he would like to Postpone surgery at this time        He does not wish to reschedule at this time as he needs his ankle to heal     C/b # [phone]    Thank you

## 2022-01-12 ENCOUNTER — TELEPHONE (OUTPATIENT)
Dept: OBGYN CLINIC | Facility: CLINIC | Age: 57
End: 2022-01-12

## 2022-01-12 ENCOUNTER — TELEPHONE (OUTPATIENT)
Dept: OBGYN CLINIC | Facility: MEDICAL CENTER | Age: 57
End: 2022-01-12

## 2022-01-12 NOTE — TELEPHONE ENCOUNTER
Patient sees Dr Cirilo Weathers  Patient is looking to change the surgery date for 4/6/2022, cannot do this date  Please give him a call back to carmen Mandujano # [phone]

## 2022-03-01 ENCOUNTER — APPOINTMENT (OUTPATIENT)
Dept: RADIOLOGY | Facility: CLINIC | Age: 57
End: 2022-03-01
Payer: COMMERCIAL

## 2022-03-01 ENCOUNTER — OFFICE VISIT (OUTPATIENT)
Dept: URGENT CARE | Facility: CLINIC | Age: 57
End: 2022-03-01
Payer: COMMERCIAL

## 2022-03-01 VITALS
TEMPERATURE: 97.1 F | RESPIRATION RATE: 18 BRPM | SYSTOLIC BLOOD PRESSURE: 114 MMHG | OXYGEN SATURATION: 95 % | HEART RATE: 78 BPM | DIASTOLIC BLOOD PRESSURE: 59 MMHG

## 2022-03-01 DIAGNOSIS — S99.912A INJURY OF LEFT ANKLE, INITIAL ENCOUNTER: ICD-10-CM

## 2022-03-01 DIAGNOSIS — S99.912A INJURY OF LEFT ANKLE, INITIAL ENCOUNTER: Primary | ICD-10-CM

## 2022-03-01 PROCEDURE — 99213 OFFICE O/P EST LOW 20 MIN: CPT | Performed by: NURSE PRACTITIONER

## 2022-03-01 PROCEDURE — S9088 SERVICES PROVIDED IN URGENT: HCPCS | Performed by: NURSE PRACTITIONER

## 2022-03-01 PROCEDURE — 73610 X-RAY EXAM OF ANKLE: CPT

## 2022-03-01 RX ORDER — SIMVASTATIN 10 MG
10 TABLET ORAL EVERY EVENING
COMMUNITY
Start: 2022-01-17

## 2022-03-01 NOTE — PROGRESS NOTES
[address] [facility]        NAME: Ingrid Dickey is a 55 y o  male  : 1965    MRN: [id number]  DATE: 2022  TIME: 7:54 PM    Assessment and Plan   Injury of left ankle, initial encounter [D74 [id number]]  1  Injury of left ankle, initial encounter  XR ankle 3+ vw left    Ambulatory Referral to Orthopedic Surgery     Concern for healing fracture, placed in walking boot    Patient Instructions     Patient Instructions     Rest, ice, elevate  Wear boot as directed  Tylenol as needed for pain  If you develop any increased pain, swelling, numbness, tingling, or any new or concerning symptoms please return or proceed to ER  Follow up with pcp in 3-5 days  Ankle Fracture   WHAT YOU NEED TO KNOW:   An ankle fracture is a break in 1 or more of the bones in your ankle  DISCHARGE INSTRUCTIONS:   Call your local emergency number (911 in the [address] [place] [address],[address]) for any of the following:   · You feel lightheaded, short of breath, and have chest pain  · You cough up blood  Return to the emergency department if:   · Your leg feels warm, tender, and painful  It may look swollen and red  · Blood soaks through your bandage  · You have severe pain in your ankle  · Your cast feels too tight  · Your foot or toes are cold or numb  · Your foot or toenails turn blue or gray  Call your doctor if:   · Your splint feels too tight  · Your swelling has increased or returned  · You have a fever  · Your pain does not go away, even after treatment  · You have questions or concerns about your condition or care  Medicines: You may need any of the following:  · Acetaminophen  decreases pain and fever  It is available without a doctor's order  Ask how much to take and how often to take it  Follow directions  Read the labels of all other medicines you are using to see if they also contain acetaminophen, or ask your doctor or pharmacist  Acetaminophen can cause liver damage if not taken correctly   Do not use more than 4 grams (4,000 milligrams) total of acetaminophen in one day  · NSAIDs , such as ibuprofen, help decrease swelling, pain, and fever  This medicine is available with or without a doctor's order  NSAIDs can cause stomach bleeding or kidney problems in certain people  If you take blood thinner medicine, always ask your healthcare provider if NSAIDs are safe for you  Always read the medicine label and follow directions  · Prescription pain medicine  may be given  Ask your healthcare provider how to take this medicine safely  Some prescription pain medicines contain acetaminophen  Do not take other medicines that contain acetaminophen without talking to your healthcare provider  Too much acetaminophen may cause liver damage  Prescription pain medicine may cause constipation  Ask your healthcare provider how to prevent or treat constipation  · Take your medicine as directed  Contact your healthcare provider if you think your medicine is not helping or if you have side effects  Tell him or her if you are allergic to any medicine  Keep a list of the medicines, vitamins, and herbs you take  Include the amounts, and when and why you take them  Bring the list or the pill bottles to follow-up visits  Carry your medicine list with you in case of an emergency  Self-care:       · Rest  your ankle so that it can heal  Return to normal activities as directed  · Apply ice  on your ankle for 15 to 20 minutes every hour or as directed  Use an ice pack, or put crushed ice in a plastic bag  Cover it with a towel  Ice helps prevent tissue damage and decreases swelling and pain  · Use a support device,  such as a brace, cast, or splint to limit your movement and protect your ankle  You may need to use crutches to protect your ankle and decrease your pain as you move around  Do not remove your device and do not put weight on your injured ankle      · Elevate  your ankle above the level of your heart as often as you can  This will help decrease swelling and pain  Prop your ankle on pillows or blankets to keep it elevated comfortably  Splint and cast care:  Cover the splint or cast before you bathe so it does not get wet  Tape 2 plastic trash bags to your skin above the cast  Try to keep your ankle out of the water as much as possible  Follow up with your doctor in 1 to 2 days, or as directed: Your fracture may need to be reduced (bones pushed back into place) or you may need surgery  Write down your questions so you remember to ask them during your visits  © Copyright [employer] 2022 Information is for End User's use only and may not be sold, redistributed or otherwise used for commercial purposes  All illustrations and images included in CareNotes® are the copyrighted property of A D A M , Inc  or Cassy Vazquez  The above information is an  only  It is not intended as medical advice for individual conditions or treatments  Talk to your doctor, nurse or pharmacist before following any medical regimen to see if it is safe and effective for you  Follow up with PCP in 3-5 days  Proceed to  ER if symptoms worsen  Chief Complaint     Chief Complaint   Patient presents with   Oly Mcgill     stumbled while stepping off of a ladder and twisted his left ankle, happened around 7 weeks ago but never healed         History of Present Illness       Fall  Incident onset: 7 weeks ago  The fall occurred from a ladder (states that he stepped off a ladder and his left foot landed in a hole and he twisted left ankle)  The pain is moderate  The symptoms are aggravated by ambulation, pressure on injury and standing  Pertinent negatives include no fever, loss of consciousness, numbness or tingling  He has tried nothing for the symptoms  Denies any previous injury or trauma  States that pain and swelling is not improving      Review of Systems   Review of Systems   Constitutional: Negative for chills, diaphoresis, fatigue and fever  Respiratory: Negative  Cardiovascular: Negative  Musculoskeletal: Positive for arthralgias, gait problem and joint swelling  Negative for back pain, myalgias, neck pain and neck stiffness  Neurological: Negative for tingling, loss of consciousness, weakness and numbness  Current Medications       Current Outpatient Medications:     insulin glargine (LANTUS) 100 units/mL subcutaneous injection, Inject 45 Units under the skin daily  , Disp: , Rfl:     LISINOPRIL PO, Take by mouth in the morning Unsure of mg , Disp: , Rfl:     metFORMIN (GLUCOPHAGE) 500 mg tablet, Take 500 mg by mouth 2 (two) times a day with meals, Disp: , Rfl:     simvastatin (ZOCOR) 10 mg tablet, Take 10 mg by mouth every evening, Disp: , Rfl:     pioglitazone (ACTOS) 30 mg tablet, Take 30 mg by mouth daily, Disp: , Rfl:     Current Allergies     Allergies as of 03/01/2022    (No Known Allergies)            The following portions of the patient's history were reviewed and updated as appropriate: allergies, current medications, past family history, past medical history, past social history, past surgical history and problem list      Past Medical History:   Diagnosis Date    Diabetes mellitus ([facility] [address] )     History of sleeve gastrectomy     Hypertension     Left ankle swelling     states thinks he sprained- swelling x 2 wks - surgeon office notified    Left shoulder pain     Mild acid reflux     Neuropathy     in feet yao    Shoulder impingement, left        Past Surgical History:   Procedure Laterality Date    GASTRECTOMY SLEEVE LAPAROSCOPIC      LAPAROSCOPIC GASTRIC BANDING         No family history on file  Medications have been verified  Objective   /59   Pulse 78   Temp (!) 97 1 °F (36 2 °C)   Resp 18   SpO2 95%   No LMP for male patient  Physical Exam     Physical Exam  HENT:      Head: Normocephalic and atraumatic     Cardiovascular:      Rate and Rhythm: Normal rate and regular rhythm  Heart sounds: Normal heart sounds, S1 normal and S2 normal    Pulmonary:      Effort: Pulmonary effort is normal       Breath sounds: Normal breath sounds and air entry  Musculoskeletal:      Left ankle: Swelling and ecchymosis present  No deformity or lacerations  Tenderness present over the lateral malleolus and medial malleolus  No ATF ligament, AITF ligament, CF ligament, posterior TF ligament, base of 5th metatarsal or proximal fibula tenderness  Normal range of motion  Normal pulse  Skin:     General: Skin is warm and dry  Capillary Refill: Capillary refill takes less than 2 seconds

## 2022-03-01 NOTE — PATIENT INSTRUCTIONS
Rest, ice, elevate  Wear boot as directed  Tylenol as needed for pain  If you develop any increased pain, swelling, numbness, tingling, or any new or concerning symptoms please return or proceed to ER  Follow up with pcp in 3-5 days  Ankle Fracture   WHAT YOU NEED TO KNOW:   An ankle fracture is a break in 1 or more of the bones in your ankle  DISCHARGE INSTRUCTIONS:   Call your local emergency number (911 in the [address] [facility],[address]) for any of the following:   · You feel lightheaded, short of breath, and have chest pain  · You cough up blood  Return to the emergency department if:   · Your leg feels warm, tender, and painful  It may look swollen and red  · Blood soaks through your bandage  · You have severe pain in your ankle  · Your cast feels too tight  · Your foot or toes are cold or numb  · Your foot or toenails turn blue or gray  Call your doctor if:   · Your splint feels too tight  · Your swelling has increased or returned  · You have a fever  · Your pain does not go away, even after treatment  · You have questions or concerns about your condition or care  Medicines: You may need any of the following:  · Acetaminophen  decreases pain and fever  It is available without a doctor's order  Ask how much to take and how often to take it  Follow directions  Read the labels of all other medicines you are using to see if they also contain acetaminophen, or ask your doctor or pharmacist  Acetaminophen can cause liver damage if not taken correctly  Do not use more than 4 grams (4,000 milligrams) total of acetaminophen in one day  · NSAIDs , such as ibuprofen, help decrease swelling, pain, and fever  This medicine is available with or without a doctor's order  NSAIDs can cause stomach bleeding or kidney problems in certain people  If you take blood thinner medicine, always ask your healthcare provider if NSAIDs are safe for you   Always read the medicine label and follow directions  · Prescription pain medicine  may be given  Ask your healthcare provider how to take this medicine safely  Some prescription pain medicines contain acetaminophen  Do not take other medicines that contain acetaminophen without talking to your healthcare provider  Too much acetaminophen may cause liver damage  Prescription pain medicine may cause constipation  Ask your healthcare provider how to prevent or treat constipation  · Take your medicine as directed  Contact your healthcare provider if you think your medicine is not helping or if you have side effects  Tell him or her if you are allergic to any medicine  Keep a list of the medicines, vitamins, and herbs you take  Include the amounts, and when and why you take them  Bring the list or the pill bottles to follow-up visits  Carry your medicine list with you in case of an emergency  Self-care:       · Rest  your ankle so that it can heal  Return to normal activities as directed  · Apply ice  on your ankle for 15 to 20 minutes every hour or as directed  Use an ice pack, or put crushed ice in a plastic bag  Cover it with a towel  Ice helps prevent tissue damage and decreases swelling and pain  · Use a support device,  such as a brace, cast, or splint to limit your movement and protect your ankle  You may need to use crutches to protect your ankle and decrease your pain as you move around  Do not remove your device and do not put weight on your injured ankle  · Elevate  your ankle above the level of your heart as often as you can  This will help decrease swelling and pain  Prop your ankle on pillows or blankets to keep it elevated comfortably  Splint and cast care:  Cover the splint or cast before you bathe so it does not get wet  Tape 2 plastic trash bags to your skin above the cast  Try to keep your ankle out of the water as much as possible  Follow up with your doctor in 1 to 2 days, or as directed:   Your fracture may need to be reduced (bones pushed back into place) or you may need surgery  Write down your questions so you remember to ask them during your visits  © Copyright [employer] 2022 Information is for End User's use only and may not be sold, redistributed or otherwise used for commercial purposes  All illustrations and images included in CareNotes® are the copyrighted property of [employer]  or [facility] Sonja Gonzalez   The above information is an  only  It is not intended as medical advice for individual conditions or treatments  Talk to your doctor, nurse or pharmacist before following any medical regimen to see if it is safe and effective for you

## 2022-03-03 ENCOUNTER — OFFICE VISIT (OUTPATIENT)
Dept: OBGYN CLINIC | Facility: CLINIC | Age: 57
End: 2022-03-03
Payer: COMMERCIAL

## 2022-03-03 VITALS
DIASTOLIC BLOOD PRESSURE: 70 MMHG | SYSTOLIC BLOOD PRESSURE: 114 MMHG | WEIGHT: 263 LBS | HEIGHT: 69 IN | BODY MASS INDEX: 38.95 KG/M2 | HEART RATE: 80 BPM

## 2022-03-03 DIAGNOSIS — R52 PAIN AGGRAVATED BY WALKING: Primary | ICD-10-CM

## 2022-03-03 DIAGNOSIS — S99.912A INJURY OF LEFT ANKLE, INITIAL ENCOUNTER: ICD-10-CM

## 2022-03-03 PROCEDURE — 99214 OFFICE O/P EST MOD 30 MIN: CPT | Performed by: FAMILY MEDICINE

## 2022-03-03 NOTE — PROGRESS NOTES
[facility]  [address]  [facility] [id number] [phone]      Chief Complaint:  Chief Complaint   Patient presents with    Left Ankle - Pain       Vitals:  /70   Pulse 80   Ht 5' 9" (1 753 m)   Wt 119 kg (263 lb)   BMI 38 84 kg/m²     The following portions of the patient's history were reviewed and updated as appropriate: allergies, current medications, past family history, past medical history, past social history, past surgical history, and problem list       Subjective:   Patient ID: Santy Moore is a 55 y o  male  Here c/o L ankle pain  7 wks ago stepped off ladder into a hole and rolled his L ankle  Hurts to walk  Seen in [facility]- xr done  Boot given  Note reviewed  Elevated/ice/bandage  Rest when he cans  No pain meds  Pain moves around  Sharp pain  Better at rest       LEFT ANKLE     INDICATION:   [id number]: Unspecified injury of left ankle, initial encounter      COMPARISON:  None     VIEWS:  XR ANKLE 3+ VW LEFT   Images: 3     FINDINGS:     There is no acute fracture or dislocation  Narrowing of the tibiotalar joint      No lytic or blastic osseous lesion      There are atherosclerotic calcifications  Soft tissue swelling about the ankle  Soft tissues are otherwise unremarkable      IMPRESSION:     No acute osseous abnormality          Review of Systems   Constitutional: Negative for fatigue and fever  Respiratory: Negative for shortness of breath  Cardiovascular: Negative for chest pain  Gastrointestinal: Negative for abdominal pain and nausea  Genitourinary: Negative for dysuria  Musculoskeletal: Positive for arthralgias  Skin: Negative for rash and wound  Neurological: Negative for weakness and headaches  Objective:  Left Ankle Exam     Tenderness   The patient is experiencing tenderness in the deltoid and ATF  Range of Motion   The patient has normal left ankle ROM       Muscle Strength   The patient has normal left ankle strength  Comments:  Pain with plantarflexion/eversion  Neg cat/squeeze          Strength/Myotome Testing     Left Ankle/Foot   Normal strength      Physical Exam  Vitals and nursing note reviewed  Constitutional:       Appearance: Normal appearance  He is well-developed  HENT:      Head: Normocephalic  Mouth/Throat:      Mouth: Mucous membranes are moist    Eyes:      Extraocular Movements: Extraocular movements intact  Cardiovascular:      Rate and Rhythm: Normal rate and regular rhythm  Heart sounds: Normal heart sounds  Pulmonary:      Effort: Pulmonary effort is normal       Breath sounds: Normal breath sounds  Abdominal:      General: Bowel sounds are normal       Palpations: Abdomen is soft  Musculoskeletal:         General: Tenderness present  Normal range of motion  Cervical back: Normal range of motion  Skin:     General: Skin is warm and dry  Neurological:      General: No focal deficit present  Mental Status: He is alert and oriented to person, place, and time  Psychiatric:         Mood and Affect: Mood normal          Behavior: Behavior normal          Thought Content: Thought content normal          I have personally reviewed pertinent films in PACS and my interpretation is XR-  L ankle- no fx  Assessment/Plan:  Assessment/Plan   Diagnoses and all orders for this visit:    Pain aggravated by walking  -     MRI ankle/heel left  wo contrast; Future    Injury of left ankle, initial encounter  -     Ambulatory Referral to Orthopedic Surgery  -     MRI ankle/heel left  wo contrast; Future        Return for f/u after MRI L ankle, Recheck       Piedad Miranda MD

## 2022-03-03 NOTE — PATIENT INSTRUCTIONS
F/u after MRI  MRI- L ankle- L ankle pain/injury 7 wks ago  XR neg  Pain with ambulation  R/o talar dome fracture  Icing/heat/OTC pain meds as needed  Continue wearing boot  Recommend crutches- patient declines

## 2022-03-11 ENCOUNTER — HOSPITAL ENCOUNTER (OUTPATIENT)
Dept: MRI IMAGING | Facility: HOSPITAL | Age: 57
Discharge: HOME | End: 2022-03-11
Attending: FAMILY MEDICINE
Payer: COMMERCIAL

## 2022-03-11 DIAGNOSIS — S99.912A INJURY OF LEFT ANKLE, INITIAL ENCOUNTER: ICD-10-CM

## 2022-03-11 DIAGNOSIS — R52 PAIN AGGRAVATED BY WALKING: ICD-10-CM

## 2022-03-11 PROCEDURE — G1004 CDSM NDSC: HCPCS

## 2022-03-11 PROCEDURE — 73721 MRI JNT OF LWR EXTRE W/O DYE: CPT

## 2022-03-15 ENCOUNTER — OFFICE VISIT (OUTPATIENT)
Dept: OBGYN CLINIC | Facility: CLINIC | Age: 57
End: 2022-03-15
Payer: COMMERCIAL

## 2022-03-15 VITALS
BODY MASS INDEX: 38.95 KG/M2 | SYSTOLIC BLOOD PRESSURE: 114 MMHG | TEMPERATURE: 98.3 F | HEART RATE: 76 BPM | HEIGHT: 69 IN | OXYGEN SATURATION: 98 % | DIASTOLIC BLOOD PRESSURE: 64 MMHG | WEIGHT: 263 LBS

## 2022-03-15 DIAGNOSIS — S93.492D SPRAIN OF ANTERIOR TALOFIBULAR LIGAMENT OF LEFT ANKLE, SUBSEQUENT ENCOUNTER: Primary | ICD-10-CM

## 2022-03-15 DIAGNOSIS — S86.312A TEAR OF PERONEAL TENDON OF LEFT FOOT: ICD-10-CM

## 2022-03-15 DIAGNOSIS — S99.912D INJURY OF LEFT ANKLE, SUBSEQUENT ENCOUNTER: ICD-10-CM

## 2022-03-15 PROCEDURE — 99214 OFFICE O/P EST MOD 30 MIN: CPT | Performed by: FAMILY MEDICINE

## 2022-03-15 NOTE — PATIENT INSTRUCTIONS
F/u 2 wks  Begin physical therapy  Icing/heat/OTC pain meds as needed  Continue wearing boot at all times  Gentle range of motion exercises  4x daily  Ankle Exercises   AMBULATORY CARE:   What you need to know about ankle exercises: Ankle exercises help strengthen your ankle and improve its function after injury  These are beginning exercises  Ask your healthcare provider if you need to see a physical therapist for more advanced exercises  · Do these exercises 3 to 5 days a week , or as directed by your healthcare provider  Ask if you should perform the exercises on each ankle  · Do the exercises in the order that your healthcare provider recommends  This will help prevent swelling, chronic pain, and reinjury  Start with range of motion exercises  Then progress to strengthening exercises, and finally to balancing exercises  · Warm up before you do ankle exercises  Walk or ride a stationary bike for 5 to 10 minutes to prepare your ankle for movement  · Stop if you feel pain  It is normal to feel some discomfort at first  Regular exercise will help decrease your discomfort over time  How to perform range of motion exercises safely:  Begin with range of motion exercises to improve flexibility  Ask your healthcare provider when you can progress to strengthening exercises  · Ankle alphabet:  Sit on a chair so that your feet do not touch the floor  Use your big toe to write each letter of the alphabet  Use only your foot and ankle, and keep your movements small  Do 2 sets  · Calf stretches:      ? Sitting calf stretches with a towel:  Sit on the floor with both legs out straight in front of you  Loop a towel around the ball of your injured foot  Grasp the ends of the towel and pull it toward you  Keep your leg and back straight  Do not lean forward as you pull the towel  Hold for 30 seconds  Then relax for 30 seconds   Do 2 sets of 10          ? Standing calf stretches:  Stand facing a wall with the foot that is not injured forward and your knee slightly bent  Keep the leg with the injured foot straight and behind you with your toes pointed in slightly  With both heels flat on the floor, press your hips forward  Do not arch your back  Hold for 30 seconds, and then relax for 30 seconds  Do 2 sets of 10  Repeat with your leg bent  Do 2 sets of 10  How to perform strengthening exercises safely:  After you can perform range of motion exercises without pain, you may begin strengthening exercises  Ask your healthcare provider when you can progress to balancing exercises  · Ankle movement in 4 directions:  Sit on the floor with your legs straight in front of you  Keep your heels on the floor for support  ? Dorsiflexion:  Begin with your toes pointing straight up  Pull your toes toward your body  Slowly return to the starting position  Do 3 sets of 5      ? Plantar flexion:  Begin with your toes pointing straight up  Push your toes away from your body  Slowly return to the starting position  Do 3 sets of 5          ? Inversion:  Begin with your toes pointing straight up  Push your toes inward, toward each other  Slowly return to the starting position  Do 3 sets of 5      ? Eversion:  Begin with your toes pointing straight up  Push your toes outward, away from each other  Slowly return to the starting position  Do 3 sets of 5        · Toe curls with a towel:  Sit on a chair so that both of your feet are flat on the floor  Place a small towel on the floor in front of your injured foot  Grab the center of the towel with your toes and curl the towel toward you  Relax and repeat  Do 1 set of 5          · [place] pick-ups:  Sit on a chair so that both of your feet are flat on the floor  Place 20 marbles on the floor in front of your injured foot  Use your toes to  one marble at a time and place it into a bowl  Repeat until you have picked up all the marbles  Do 1 set       · Heel raises:      ? Single leg heel raises:  Stand with your weight evenly on both feet  Hold on to a chair or a wall for balance  Lift the foot that is not injured off the floor so all your weight is placed on your injured foot  Raise the heel of your injured foot as high as you can  Slowly lower your heel to the floor  Do 1 set of 10          ? Double leg heel raises:  Stand with your weight evenly on both feet  Hold on to a chair or a wall for balance  Raise both of your heels as high as you can  Slowly lower your heels to the floor  Do 1 set of 10  · Heel and toe walks:      ? Heel walks:  Begin in a standing position  Lift your toes off the floor and walk on your heels  Keep your toes lifted as high as possible  Do 2 sets of 10          ? Toe walks:  Begin in a standing position  Lift your heels off the floor and walk on the balls and toes of your feet  Keep your heels lifted as high as possible  Do 2 sets of 10  How to perform a balance exercise safely:  After you can perform strengthening exercises without pain, you may do this beginning balancing exercise  Ask your healthcare provider for more advanced balance exercises  · Single leg stance:  Stand with your weight evenly on both feet, or hold on to a chair or a wall  Do not lean to the side  Lift the foot that is not injured off the floor so all your weight is placed on your injured foot  Balance on your injured foot  Ask your healthcare provider how long to hold this position  Contact your healthcare provider if:   · Your pain becomes worse  · You have new pain  · You have questions or concerns about your condition, care, or exercise program     © Copyright [employer] 2022 Information is for End User's use only and may not be sold, redistributed or otherwise used for commercial purposes   All illustrations and images included in CareNotes® are the copyrighted property of A D A M [employer] Inc  or Cassy Vazquez  The above information is an  only  It is not intended as medical advice for individual conditions or treatments  Talk to your doctor, nurse or pharmacist before following any medical regimen to see if it is safe and effective for you

## 2022-03-15 NOTE — PROGRESS NOTES
[facility] [address] [facility]  [address]  [address] [place] [address] [phone]      Chief Complaint:  Chief Complaint   Patient presents with    Left Ankle - Follow-up       Vitals:  /64 (BP Location: Left arm, Patient Position: Sitting, Cuff Size: Large)   Pulse 76   Temp 98 3 °F (36 8 °C) (Tympanic)   Ht 5' 9" (1 753 m)   Wt 119 kg (263 lb)   SpO2 98%   BMI 38 84 kg/m²     The following portions of the patient's history were reviewed and updated as appropriate: allergies, current medications, past family history, past medical history, past social history, past surgical history, and problem list       Subjective:   Patient ID: Yessenia Flor is a 55 y o  male  Here for f/u  L ankle pain/MRI  Hurts to walk but less  Not wearing boot today  Wearing boot with prolonged walking  No pain meds  Sharp pain  Better at rest       MRI LEFT ANKLE WITHOUT CONTRAST     INDICATION:   R94 800A: Unspecified injury of left ankle, initial encounter  R52: Pain, unspecified      COMPARISON:  Left ankle radiographs 3/1/2022     TECHNIQUE:   MR sequences were obtained of the left ankle including: Localizers, coronal STIR, coronal T1, axial T2 fat sat, axial PD, sagittal T2 fat sat, sagittal T1 sequences were obtained       Gadolinium was not used      FINDINGS:     SUBCUTANEOUS TISSUES:  There is diffuse subcutaneous edema      JOINT EFFUSION:  None      TENDONS:  Achilles tendon: Normal   Peroneus brevis and longus: Longitudinal split tear of the peroneus brevis tendon  Intact peroneus longus tendon  Posterior tibialis, flexor digitorum longus, flexor hallucis longus: Normal   Anterior tibialis, extensor digitorum longus, extensor hallucis longus:  Normal      LIGAMENTS:  Lateral collateral ligament complex: The anterior talofibular ligament is torn, while the calcaneofibular and posterior talofibular ligaments are intact    Distal tibiofibular syndesmosis:  Normal   Medial collateral ligament complex:  Normal      PLANTAR FASCIA:  Normal      ARTICULAR SURFACES:  Moderate tibiotalar and mild subtalar osteoarthritis  Mild talonavicular and 1st tarsometatarsal osteoarthritis      SINUS TARSI:  Normal      TARSAL TUNNEL:  Unremarkable      BONES:  Subchondral marrow edema in the distal tibia, talar head, and talar dome related degenerative changes  Otherwise normal marrow signal   Specifically, no T1 hypointense fracture line      MUSCULATURE:  Diffuse T2 hyperintensity and fatty atrophy of the visualized intrinsic foot musculature, consistent with subacute-on-chronic denervation changes      IMPRESSION:     No acute fracture      Longitudinal split tear of the peroneus brevis tendon      Tear of the anterior talofibular ligament      Moderate tibiotalar osteoarthritis            Review of Systems   Constitutional: Negative for fatigue and fever  Respiratory: Negative for shortness of breath  Cardiovascular: Negative for chest pain  Gastrointestinal: Negative for abdominal pain and nausea  Genitourinary: Negative for dysuria  Musculoskeletal: Positive for arthralgias  Skin: Negative for rash and wound  Neurological: Negative for weakness and headaches  Objective:  Left Ankle Exam     Tenderness   The patient is experiencing tenderness in the ATF and deltoid  Range of Motion   The patient has normal left ankle ROM  Muscle Strength   The patient has normal left ankle strength  Strength/Myotome Testing     Left Ankle/Foot   Normal strength      Physical Exam  Constitutional:       Appearance: Normal appearance  He is normal weight  Eyes:      Extraocular Movements: Extraocular movements intact  Pulmonary:      Effort: Pulmonary effort is normal    Musculoskeletal:         General: Tenderness present  Cervical back: Normal range of motion  Skin:     General: Skin is warm and dry  Neurological:      General: No focal deficit present  Mental Status: He is alert and oriented to person, place, and time  Mental status is at baseline  Psychiatric:         Mood and Affect: Mood normal          Behavior: Behavior normal          Thought Content: Thought content normal          Judgment: Judgment normal          I have personally reviewed pertinent films in PACS and my interpretation is MR  L ankle- longitudinal split tear of peroneus brevis tendon/complete tear of ATFL  Assessment/Plan:  Assessment/Plan   There are no diagnoses linked to this encounter  No follow-ups on file       Tk Buchanan MD

## 2022-03-16 ENCOUNTER — EVALUATION (OUTPATIENT)
Dept: PHYSICAL THERAPY | Facility: CLINIC | Age: 57
End: 2022-03-16
Payer: COMMERCIAL

## 2022-03-16 DIAGNOSIS — S93.492D SPRAIN OF ANTERIOR TALOFIBULAR LIGAMENT OF LEFT ANKLE, SUBSEQUENT ENCOUNTER: Primary | ICD-10-CM

## 2022-03-16 DIAGNOSIS — S99.912D INJURY OF LEFT ANKLE, SUBSEQUENT ENCOUNTER: ICD-10-CM

## 2022-03-16 DIAGNOSIS — S86.312D PERONEAL TENDON TEAR, LEFT, SUBSEQUENT ENCOUNTER: ICD-10-CM

## 2022-03-16 PROCEDURE — 97110 THERAPEUTIC EXERCISES: CPT | Performed by: PHYSICAL THERAPIST

## 2022-03-16 PROCEDURE — 97112 NEUROMUSCULAR REEDUCATION: CPT | Performed by: PHYSICAL THERAPIST

## 2022-03-16 PROCEDURE — 97162 PT EVAL MOD COMPLEX 30 MIN: CPT | Performed by: PHYSICAL THERAPIST

## 2022-03-16 NOTE — PROGRESS NOTES
PT Evaluation     Today's date: 3/16/2022  Patient name: Violet Coppola  : 1965  MRN: [id number]  Referring provider: Bryan Mendez MD  Dx:   Encounter Diagnosis     ICD-10-CM    1  Sprain of anterior talofibular ligament of left ankle, subsequent encounter  S93 492D    2  Injury of left ankle, subsequent encounter  S99 912D    3  Peroneal tendon tear, left, subsequent encounter  S86 312D                   Assessment  Assessment details: Violet Coppola is a 55 y o  male with a history of DM, Hx sleeve gastrectomy, HTN, GERD, neuropathy, and L shoulder impingement that presents for a moderate complexity physical therapy initial evaluation  The patient demonstrates signs and symptoms consistent with left ankle ATFL sprain, L peroneal tendon tear, and L ankle injury  During the examination the patient demonstrated decreased L ankle strength, decreased L ankle ROM, gait dysfunction, and L ankle pain  The patient's impairments are causing the following functional limitations: difficulty with prolonged standing, prolonged walking, walking on unlevel surfaces, and difficulty transferring from low surfaces  The patient's clinical presentation is evolving due to a number of participation restrictions, significant medial history, and functional limitation (FOTO 59% function)  The patient will benefit from skilled PT services to address impairments, work towards goals, and restore PLOF  Impairments: abnormal gait, abnormal or restricted ROM, activity intolerance, impaired balance, impaired physical strength, lacks appropriate home exercise program, pain with function and weight-bearing intolerance  Functional limitations: difficulty with prolonged standing, prolonged walking, walking on unlevel surfaces, and difficulty transferring from low surfaces  Symptom irritability: moderateUnderstanding of Dx/Px/POC: fair   Prognosis: fair    Goals  STG: Achieve in 4-6 weeks  1    Patient's L ankle pain at worst less than 2/10 to allow for proper gait  2   Patient's L ankle ROM improve by 10-15 degrees to improve prolonged ambulation  3   L LE MMT improve to > 4+/5 all motions tested to improve walking tolerance/recreational activities  LTG:  Achieve in 6-12 weeks  1  Patient's ankle FOTO score improve to  > 73% to indicate a return to PLOF  2   Patient achieve personal goal of decreasing L ankle pain to less than 2/10 with all activities  3  Patient to achieve independence with home exercise plan  4  Patient single leg stand on L LE > 30 seconds without LOB to indicate a return of normal balance  Plan  Plan details: RE-ASSESS 1X/MONTH  Patient would benefit from: skilled physical therapy  Planned modality interventions: TENS, cryotherapy, thermotherapy: hydrocollator packs and ultrasound  Other planned modality interventions: IASTM  Planned therapy interventions: joint mobilization, manual therapy, massage, neuromuscular re-education, patient education, postural training, self care, strengthening, stretching, therapeutic activities, therapeutic exercise, home exercise program, abdominal trunk stabilization, balance, balance/weight bearing training and gait training  Frequency: 1-3X/WK  Duration in weeks: 12  Plan of Care beginning date: 3/16/2022  Plan of Care expiration date: 6/15/2022  Treatment plan discussed with: PTA and patient        Subjective Evaluation    History of Present Illness  Date of onset: 1/12/2022  Mechanism of injury: Anthony Self is a 55 y o  male that presents to outpatient physical therapy with complaints of left ankle pain  The patient reports onset 8 weeks ago via inversion ankle sprain after stepping in a hole on the floor causing the ankle to roll  The patient attempted rest and elevation x 6 weeks without much change  The pain persisted so the patient sought medical care    The patient saw orthopedics who ordered a L ankle MRI which revealed a sprained ATFL, and torn peroneal tendons  The patient was prescribed a cam boot for the next 2 weeks followed by transition to an ankle support  The patient was referred to outpatient PT  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 - he notes limitation with prolonged walking, standing, difficulty walking on unlevel ground, difficulty performing work tasks  The patient's main goal for physical therapy is to have less pain at the left ankle  MRI L ankle : IMPRESSION:     No acute fracture      Longitudinal split tear of the peroneus brevis tendon      Tear of the anterior talofibular ligament      Moderate tibiotalar osteoarthritis  Pain  Current pain ratin  At best pain rating: 3  At worst pain ratin  Location: L anterior ankle joint line, medial ankle  Quality: sharp, discomfort and pulling  Relieving factors: rest  Aggravating factors: standing, walking and running  Progression: improved    Social Support  Steps to enter house: yes  1  Lives in: [facility]    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[employer])  Hand dominance: right      Diagnostic Tests  MRI studies: abnormal  Treatments  Current treatment: physical therapy  Patient Goals  Patient goals for therapy: decreased pain, improved balance, increased motion, increased strength, independence with ADLs/IADLs, return to sport/leisure activities and decreased edema  Patient goal: decrease pain        Objective     Palpation     Additional Palpation Details  NO TTP    Tenderness   Left Ankle/Foot   Tenderness in the anterior ankle, anterior talofibular ligament, lateral malleolus and medial malleolus  No tenderness in the Achilles insertion, calcaneofibular ligament and plantar fascia       Neurological Testing     Sensation     Ankle/Foot   Left Ankle/Foot   Intact: light touch    Right Ankle/Foot   Intact: light touch     Active Range of Motion   Left Ankle/Foot   Dorsiflexion (ke): 6 degrees   Dorsiflexion (kf): 6 degrees   Plantar flexion: 30 degrees   Inversion: 22 degrees   Eversion: 6 degrees   Great toe flexion: WFL  Great toe extension: WFL  Lesser toes: WFL    Right Ankle/Foot   Dorsiflexion (ke): 12 degrees   Dorsiflexion (kf): 16 degrees   Plantar flexion: 50 degrees   Inversion: 34 degrees   Eversion: 32 degrees   Great toe flexion: WFL  Great toe extension: WFL  Lesser toes: WFL    Passive Range of Motion   Left Ankle/Foot    Dorsiflexion (ke): 6 degrees   Dorsiflexion (kf): 6 degrees   Plantar flexion: 31 degrees   Inversion: 22 degrees with pain  Eversion: 7 degrees with pain    Right Ankle/Foot    Dorsiflexion (ke): 14 degrees   Dorsiflexion (kf): 18 degrees    Plantar flexion: 50 degrees   Inversion: 34 degrees   Eversion: 34 degrees   Great toe flexion: WFL  Great toe extension: WFL  Lesser toes: WFL    Strength/Myotome Testing     Left Ankle/Foot   Dorsiflexion: 3+  Plantar flexion: 3+  Inversion: 3+  Eversion: 3+  Great toe flexion: 3+  Great toe extension: 3+    Right Ankle/Foot   Dorsiflexion: 5  Plantar flexion: 5  Inversion: 5  Eversion: 5  Great toe flexion: 5  Great toe extension: 5    Tests     Additional Tests Details  FOTO: 59% ( predicted 73%)    Gait impairments: Patient ambulates with no AD WBAT L LE in a cam boot  The patient demonstrates slow gait speed, unequal step lengths, and decreased heel-toe rollover  (+) antalgic gait with L LE limp    TU seconds    Patient is able to SLB in cam boot x 10 seconds      Swelling   Left Ankle/Foot   [place]: 27 cm    Right Ankle/Foot   [place]: 25 cm             Precautions: WBAT L LE IN BOOT X 2 WEEK (3/30)F/B ANKLE SUPPORT  RE:   Access Code: [facility]       Manuals 3/16            Gentle Stretching ankle all direction  *           Midfoot,rearfootTC, forefoot mobs  grade 1-2 for pain relief           Toe stretch                          Neuro Re-Ed             Toe Yoga x10            [place]   *           Tandem Balance             Towel scrunches x10 bridges  *           SLR all planes  *           Clam shell  *           Fitter/balance board             BAPS taps,circles  *           Steam Boats             Arch lifts x10            Foam balance             Ther Ex             Ankle ABC's, circles cw,ccw x15 ea            HR/TR sit/stand Sit x15            INV/EV wipes  *           Bike             Nu step  *           Strap soleus 4x:30            Gastroc stretch 4x:30            T-band EX all  *           Ther Activity             Crate carry/lifts             Step Ups  *           Mini squats             Up/Down stairs             Chair squat  *           Gait Training             Mirror walk             Hurdles OBO             Heel toe amb             Sidestepping  *           Braiding             Modalities             CP/MHP ankle

## 2022-03-21 ENCOUNTER — OFFICE VISIT (OUTPATIENT)
Dept: PHYSICAL THERAPY | Facility: CLINIC | Age: 57
End: 2022-03-21
Payer: COMMERCIAL

## 2022-03-21 DIAGNOSIS — S86.312D PERONEAL TENDON TEAR, LEFT, SUBSEQUENT ENCOUNTER: ICD-10-CM

## 2022-03-21 DIAGNOSIS — S93.492D SPRAIN OF ANTERIOR TALOFIBULAR LIGAMENT OF LEFT ANKLE, SUBSEQUENT ENCOUNTER: Primary | ICD-10-CM

## 2022-03-21 DIAGNOSIS — S99.912D INJURY OF LEFT ANKLE, SUBSEQUENT ENCOUNTER: ICD-10-CM

## 2022-03-21 PROCEDURE — 97110 THERAPEUTIC EXERCISES: CPT

## 2022-03-21 PROCEDURE — 97530 THERAPEUTIC ACTIVITIES: CPT

## 2022-03-21 PROCEDURE — 97112 NEUROMUSCULAR REEDUCATION: CPT

## 2022-03-21 PROCEDURE — 97140 MANUAL THERAPY 1/> REGIONS: CPT

## 2022-03-21 NOTE — PROGRESS NOTES
Daily Note     Today's date: 3/21/2022  Patient name: Zeinab Anthony  : 1965  MRN: [id number]  Referring provider: Nicole Mo MD  Dx:   Encounter Diagnosis     ICD-10-CM    1  Sprain of anterior talofibular ligament of left ankle, subsequent encounter  S93 492D    2  Injury of left ankle, subsequent encounter  S99 912D    3  Peroneal tendon tear, left, subsequent encounter  S86 312D        Start Time: 1129          Subjective: Patient states his pain is a 2-3/10 in the left ankle at dorsal crease  Patient states exercises are going well at home  He noticed his left hip has been feeling better with the exercises  He still has discomfort with right hip, but he thinks it is from weight bearing through it more  Objective: See treatment diary below    Patient continues to limp during ambulation  Patient's home exercise program updated to include additional exercises  Handout issued and explained with demonstrations  Patient accepted new exercises  Red theraband issued for patient's home use  Assessment: Tolerated treatment well  Patient would benefit from continued PT for stretching and strengthening  Patient was able to add exercises to his program with little difficulty and no increase of discomfort  Patient seemed to understand all education on new exercises  Patient felt "looser" by the end of the session  Plan: Continue per plan of care  Progress treatment as tolerated         Precautions: WBAT L LE IN BOOT X 2 WEEK (3/30)F/B ANKLE SUPPORT  RE:   Access Code: [facility]       Manuals 3/16 3/21           Gentle Stretching ankle all direction  *stretches performed           Midfoot,rearfootTC, forefoot mobs  stretches performed           Toe stretch                          Neuro Re-Ed             Toe Yoga x10 x10           [place]   *x2 min           Tandem Balance             Towel scrunches x10 x10           bridges  *x10           SLR all planes  *x10           Clam shell *x10           Fitter/balance board             BAPS taps,circles  *L2 x10 ea           Steam Boats             Arch lifts x10 x10           Foam balance             Ther Ex             Ankle ABC's, circles cw,ccw x15 ea HEP           HR/TR sit/stand Sit x15 Sit x15           INV/EV wipes  *x10           Bike             Nu step  *L1 x5 min           Strap soleus 4x:30 :30x3           Gastroc stretch 4x:30 :30x3           T-band EX all  *red x10 ea           Ther Activity             Crate carry/lifts             Step Ups  *6" x10 fwd   2x pinch           Mini squats             Up/Down stairs             Chair squat  *x10           Gait Training             Mirror walk             Hurdles OBO             Heel toe amb             Sidestepping  *10 ft x4           Braiding             Modalities             CP/MHP ankle                            Access Code: [email or web address]  URL: [email or web address]  Date: 03/21/2022  Prepared by: Minerva Mccullough    Exercises  · Seated Gastroc Stretch with Strap - 2 x daily - 7 x weekly - 1 sets - 4 reps - 30 hold  · Seated Heel Raise - 2 x daily - 7 x weekly - 3 sets - 10 reps - 2 hold  · Seated Soleus Stretch with Strap - 2 x daily - 7 x weekly - 1 sets - 4 reps - 30 hold  · Seated Toe Raise - 2 x daily - 7 x weekly - 3 sets - 10 reps - 2 hold  · Supine Ankle Circles - 2 x daily - 7 x weekly - 3 sets - 10 reps  · Seated Great Toe Extension - 2 x daily - 7 x weekly - 3 sets - 10 reps - 2 hold  · Seated Lesser Toes Extension - 2 x daily - 7 x weekly - 3 sets - 10 reps - 2 hold  · Towel Scrunches - 2 x daily - 7 x weekly - 3 sets - 10 reps - 2 hold  · Seated Arch Lifts - 2 x daily - 7 x weekly - 3 sets - 10 reps - 2 hold  · Seated [place] Pick-Up with Toes - 1 x daily - 7 x weekly - 3 sets - 10 reps  · Supine Bridge - 2 x daily - 7 x weekly - 1 sets - 10 reps - 3 sec hold  · Supine Straight Leg Raises - 2 x daily - 7 x weekly - 1 sets - 10 reps  · Clamshell - 2 x daily - 7 x weekly - 1 sets - 10 reps - 3 sec hold  · Ankle Inversion Eversion Towel Slide - 2 x daily - 7 x weekly - 1 sets - 10 reps  · Sit to Stand - 2 x daily - 7 x weekly - 1 sets - 10 reps  · Side Stepping with Counter Support - 2 x daily - 7 x weekly - 3 sets - 10 reps  · Ankle Dorsiflexion with Resistance - 2 x daily - 7 x weekly - 1 sets - 10 reps  · Ankle Eversion with Resistance - 2 x daily - 7 x weekly - 1 sets - 10 reps  · Seated Ankle Inversion with Resistance and Legs Crossed - 2 x daily - 7 x weekly - 1 sets - 10 reps  · Seated Ankle Plantarflexion with Resistance - 2 x daily - 7 x weekly - 1 sets - 10 reps

## 2022-03-24 ENCOUNTER — APPOINTMENT (OUTPATIENT)
Dept: PHYSICAL THERAPY | Facility: CLINIC | Age: 57
End: 2022-03-24
Payer: COMMERCIAL

## 2022-03-24 NOTE — PROGRESS NOTES
Daily Note     Today's date: 3/24/2022  Patient name: Diego Britton  : 1965  MRN: [id number]  Referring provider: Jenna Waldron MD  Dx:   Encounter Diagnosis     ICD-10-CM    1  Sprain of anterior talofibular ligament of left ankle, subsequent encounter  S93 492D    2  Injury of left ankle, subsequent encounter  S99 912D    3  Peroneal tendon tear, left, subsequent encounter  S86 312D                   Subjective: ***      Objective: See treatment diary below      Assessment: Tolerated treatment {Tolerated treatment :[id number]}   Patient {assessment:[id number]}      Plan: {PLAN:[id number]}     Precautions: WBAT L LE IN BOOT X 2 WEEK (3/30)F/B ANKLE SUPPORT  RE:   Access Code: [facility]       Manuals 3/16 3/21           Gentle Stretching ankle all direction  *stretches performed           Midfoot,rearfootTC, forefoot mobs  stretches performed           Toe stretch                          Neuro Re-Ed             Toe Yoga x10 x10           [place]   *x2 min           Tandem Balance             Towel scrunches x10 x10           bridges  *x10           SLR all planes  *x10           Clam shell  *x10           Fitter/balance board             BAPS taps,circles  *L2 x10 ea           Steam Boats             Arch lifts x10 x10           Foam balance             Ther Ex             Ankle ABC's, circles cw,ccw x15 ea HEP           HR/TR sit/stand Sit x15 Sit x15           INV/EV wipes  *x10           Bike             Nu step  *L1 x5 min           Strap soleus 4x:30 :30x3           Gastroc stretch 4x:30 :30x3           T-band EX all  *red x10 ea           Ther Activity             Crate carry/lifts             Step Ups  *6" x10 fwd   2x pinch           Mini squats             Up/Down stairs             Chair squat  *x10           Gait Training             Mirror walk             Hurdles OBO             Heel toe amb             Sidestepping  *10 ft x4           Braiding             Modalities MITCHELL/JULIANP ankle                            Access Code: [email or web address]  URL: [email or web address]  Date: 03/21/2022  Prepared by: Adam Mccullough    Exercises  · Seated Gastroc Stretch with Strap - 2 x daily - 7 x weekly - 1 sets - 4 reps - 30 hold  · Seated Heel Raise - 2 x daily - 7 x weekly - 3 sets - 10 reps - 2 hold  · Seated Soleus Stretch with Strap - 2 x daily - 7 x weekly - 1 sets - 4 reps - 30 hold  · Seated Toe Raise - 2 x daily - 7 x weekly - 3 sets - 10 reps - 2 hold  · Supine Ankle Circles - 2 x daily - 7 x weekly - 3 sets - 10 reps  · Seated Great Toe Extension - 2 x daily - 7 x weekly - 3 sets - 10 reps - 2 hold  · Seated Lesser Toes Extension - 2 x daily - 7 x weekly - 3 sets - 10 reps - 2 hold  · Towel Scrunches - 2 x daily - 7 x weekly - 3 sets - 10 reps - 2 hold  · Seated Arch Lifts - 2 x daily - 7 x weekly - 3 sets - 10 reps - 2 hold  · Seated [place] Pick-Up with Toes - 1 x daily - 7 x weekly - 3 sets - 10 reps  · Supine Bridge - 2 x daily - 7 x weekly - 1 sets - 10 reps - 3 sec hold  · Supine Straight Leg Raises - 2 x daily - 7 x weekly - 1 sets - 10 reps  · Clamshell - 2 x daily - 7 x weekly - 1 sets - 10 reps - 3 sec hold  · Ankle Inversion Eversion Towel Slide - 2 x daily - 7 x weekly - 1 sets - 10 reps  · Sit to Stand - 2 x daily - 7 x weekly - 1 sets - 10 reps  · Side Stepping with Counter Support - 2 x daily - 7 x weekly - 3 sets - 10 reps  · Ankle Dorsiflexion with Resistance - 2 x daily - 7 x weekly - 1 sets - 10 reps  · Ankle Eversion with Resistance - 2 x daily - 7 x weekly - 1 sets - 10 reps  · Seated Ankle Inversion with Resistance and Legs Crossed - 2 x daily - 7 x weekly - 1 sets - 10 reps  · Seated Ankle Plantarflexion with Resistance - 2 x daily - 7 x weekly - 1 sets - 10 reps

## 2022-03-28 ENCOUNTER — OFFICE VISIT (OUTPATIENT)
Dept: PHYSICAL THERAPY | Facility: CLINIC | Age: 57
End: 2022-03-28
Payer: COMMERCIAL

## 2022-03-28 DIAGNOSIS — S93.492D SPRAIN OF ANTERIOR TALOFIBULAR LIGAMENT OF LEFT ANKLE, SUBSEQUENT ENCOUNTER: Primary | ICD-10-CM

## 2022-03-28 DIAGNOSIS — S86.312D PERONEAL TENDON TEAR, LEFT, SUBSEQUENT ENCOUNTER: ICD-10-CM

## 2022-03-28 DIAGNOSIS — S99.912D INJURY OF LEFT ANKLE, SUBSEQUENT ENCOUNTER: ICD-10-CM

## 2022-03-28 PROCEDURE — 97140 MANUAL THERAPY 1/> REGIONS: CPT

## 2022-03-28 PROCEDURE — 97110 THERAPEUTIC EXERCISES: CPT

## 2022-03-28 PROCEDURE — 97530 THERAPEUTIC ACTIVITIES: CPT

## 2022-03-28 PROCEDURE — 97112 NEUROMUSCULAR REEDUCATION: CPT

## 2022-03-28 NOTE — PROGRESS NOTES
Daily Note     Today's date: 3/28/2022  Patient name: Violet Ashley  : 1965  MRN: [id number]  Referring provider: Beronica Sosa MD  Dx:   Encounter Diagnosis     ICD-10-CM    1  Sprain of anterior talofibular ligament of left ankle, subsequent encounter  S93 492D    2  Injury of left ankle, subsequent encounter  S99 912D    3  Peroneal tendon tear, left, subsequent encounter  S86 312D        Start Time: 1118          Subjective: Patient states his pain is a 5/10 in left medial ankle  He states he was sore after last session  He noticed he has more pain on rest days then when he is working and standing on the foot for hours  He reports not doing his exercises over the weekend because of being busy at work  He felt that was enough exercises for the ankle at the time  Objective: See treatment diary below    Reviewed patient home exercise program and made a few pain free adjustments  Patient encouraged to do his exercises throughout the day for improved benefit of strength and ROM  Patient continues to limp during gait with effected leg slightly out to the side  Assessment: Tolerated treatment well  Patient would benefit from continued PT for stretching and strengthening  Patient has limited ankle eversion during exercises  He has a tendency to lift toes during the exercises  He also has discomfort at end range toe off  Patient seemed to understand all education during session  He felt less pain in the ankle when he finished session  Plan: Continue per plan of care  Progress treatment as tolerated         Precautions: WBAT L LE IN BOOT X 2 WEEK (3/30)F/B ANKLE SUPPORT  RE:   Access Code: [facility]       Manuals 3/16 3/21 3/28          Gentle Stretching ankle all direction  *stretches performed stretches performed          Midfoot,rearfootTC, forefoot mobs  stretches performed stretches performed          Toe stretch                          Neuro Re-Ed             Toe Yoga x10 x10 x10 [place]   *x2 min x2 min                       Back ext   x10          Tandem Balance             Towel scrunches x10 x10 x10          bridges  *x10 x10          SLR all planes  *x10 2x5          Clam shell  *x10 x10          Fitter/balance board             BAPS taps,circles  *L2 x10 ea L2 x10 ea          Steam Boats             Arch lifts x10 x10 x10          Foam balance             Ther Ex             Ankle ABC's, circles cw,ccw x15 ea HEP           HR/TR sit/stand Sit x15 Sit x15 Sit x10          INV/EV wipes  *x10 x10          Bike             Nu step  *L1 x5 min L2 x8 min          Strap soleus 4x:30 :30x3 :30x3          Gastroc stretch 4x:30 :30x3 :30x3          T-band EX all  *red x10 ea Red x10 gentle          Ther Activity             Crate carry/lifts             Step Ups  *6" x10 fwd   2x pinch 4 " x10 fwd  Side x2 pinch held          Mini squats             Up/Down stairs             Chair squat  *x10 Feet staggered x10          Gait Training             Mirror walk             Hurdles OBO             Heel toe amb             Sidestepping  *10 ft x4 10ft x4          Braiding             Modalities             CP/MHP ankle                            Access Code: [email or web address]  URL: [email or web address]  Date: 03/21/2022  Prepared by: Bruce Mccullough    Exercises  · Seated Gastroc Stretch with Strap - 2 x daily - 7 x weekly - 1 sets - 4 reps - 30 hold  · Seated Heel Raise - 2 x daily - 7 x weekly - 3 sets - 10 reps - 2 hold  · Seated Soleus Stretch with Strap - 2 x daily - 7 x weekly - 1 sets - 4 reps - 30 hold  · Seated Toe Raise - 2 x daily - 7 x weekly - 3 sets - 10 reps - 2 hold  · Supine Ankle Circles - 2 x daily - 7 x weekly - 3 sets - 10 reps  · Seated Great Toe Extension - 2 x daily - 7 x weekly - 3 sets - 10 reps - 2 hold  · Seated Lesser Toes Extension - 2 x daily - 7 x weekly - 3 sets - 10 reps - 2 hold  · Towel Scrunches - 2 x daily - 7 x weekly - 3 sets - 10 reps - 2 hold  · Seated Arch Lifts - 2 x daily - 7 x weekly - 3 sets - 10 reps - 2 hold  · Seated [place] Pick-Up with Toes - 1 x daily - 7 x weekly - 3 sets - 10 reps  · Supine Bridge - 2 x daily - 7 x weekly - 1 sets - 10 reps - 3 sec hold  · Supine Straight Leg Raises - 2 x daily - 7 x weekly - 1 sets - 10 reps  · Clamshell - 2 x daily - 7 x weekly - 1 sets - 10 reps - 3 sec hold  · Ankle Inversion Eversion Towel Slide - 2 x daily - 7 x weekly - 1 sets - 10 reps  · Sit to Stand - 2 x daily - 7 x weekly - 1 sets - 10 reps  · Side Stepping with Counter Support - 2 x daily - 7 x weekly - 3 sets - 10 reps  · Ankle Dorsiflexion with Resistance - 2 x daily - 7 x weekly - 1 sets - 10 reps  · Ankle Eversion with Resistance - 2 x daily - 7 x weekly - 1 sets - 10 reps  · Seated Ankle Inversion with Resistance and Legs Crossed - 2 x daily - 7 x weekly - 1 sets - 10 reps  · Seated Ankle Plantarflexion with Resistance - 2 x daily - 7 x weekly - 1 sets - 10 reps

## 2022-03-31 ENCOUNTER — OFFICE VISIT (OUTPATIENT)
Dept: PHYSICAL THERAPY | Facility: CLINIC | Age: 57
End: 2022-03-31
Payer: COMMERCIAL

## 2022-03-31 DIAGNOSIS — S86.312D PERONEAL TENDON TEAR, LEFT, SUBSEQUENT ENCOUNTER: ICD-10-CM

## 2022-03-31 DIAGNOSIS — S93.492D SPRAIN OF ANTERIOR TALOFIBULAR LIGAMENT OF LEFT ANKLE, SUBSEQUENT ENCOUNTER: Primary | ICD-10-CM

## 2022-03-31 DIAGNOSIS — S99.912D INJURY OF LEFT ANKLE, SUBSEQUENT ENCOUNTER: ICD-10-CM

## 2022-03-31 PROCEDURE — 97112 NEUROMUSCULAR REEDUCATION: CPT | Performed by: PHYSICAL THERAPIST

## 2022-03-31 PROCEDURE — 97110 THERAPEUTIC EXERCISES: CPT | Performed by: PHYSICAL THERAPIST

## 2022-03-31 PROCEDURE — 97140 MANUAL THERAPY 1/> REGIONS: CPT | Performed by: PHYSICAL THERAPIST

## 2022-03-31 NOTE — PROGRESS NOTES
Daily Note     Today's date: 3/31/2022  Patient name: Gisselle Richter  : 1965  MRN: [id number]  Referring provider: Maci White MD  Dx:   Encounter Diagnosis     ICD-10-CM    1  Sprain of anterior talofibular ligament of left ankle, subsequent encounter  S93 492D    2  Injury of left ankle, subsequent encounter  S99 912D    3  Peroneal tendon tear, left, subsequent encounter  S86 312D                   Subjective: The patient notes low pain today at the L ankle  The patient tolerated the treatment well and had no pain the following day  This is unlike last week when the patient's symptoms were aggravated by PT  The patient reports the bot is not practical and he has not worn for the past 1 5-2 weeks  The patient does not have an ankle support  Objective: See treatment diary below      Assessment:  The patient has L ankle tightness/ hard end feel with stretching into L ankle DF  The patient was shown some DF stretches in WB to attempt to improve this ROM  The patient benefited from a lateral post ( ankle into slight EV) which helped take pressure off of the joint line area and felt like more of a stretch at the calf  The patient was instructed to start some proprioception/balance activities  The patient tolerated all activities well with no complaints of pain increase at the end of the treatment  The patient is requesting to finish therapy for his L ankle next week as he is having shoulder surgery the following week  Plan: Continue per plan of care  Progress treatment as tolerated         Precautions: WBAT L LE IN BOOT X 2 WEEK (3/30)F/B ANKLE SUPPORT  RE:   Access Code: [facility]       Manuals 3/16 3/21 3/28 3/31         Gentle Stretching ankle all direction  *stretches performed stretches performed Done AD         Midfoot,rearfootTC, forefoot mobs  stretches performed stretches performed Done AD         Toe stretch                          Neuro Re-Ed             Toe Yoga x10 x10 x10          [place]   *x2 min x2 min X 2 min                      Back ext   x10 review         Tandem Balance    2x:30         Towel scrunches x10 x10 x10          bridges  *x10 x10 review         SLR all planes  *x10 2x5 review         Clam shell  *x10 x10 review         Fitter/balance board             BAPS taps,circles  *L2 x10 ea L2 x10 ea L2 x 20 ea         Steam Boats             Arch lifts x10 x10 x10          Foam balance             Ther Ex             Ankle ABC's, circles cw,ccw x15 ea HEP           HR/TR sit/stand Sit x15 Sit x15 Sit x10 stand         INV/EV wipes  *x10 x10          Bike S=14    L1 x 10 mins         Nu step  *L1 x5 min L2 x8 min          Strap soleus 4x:30 :30x3 :30x3 Step x10 p!p! Gastroc stretch 4x:30 :30x3 :30x3 Stand 3x:30 w/ lateral post at foot         T-band EX all  *red x10 ea Red x10 gentle GRN  x15         Ther Activity             Crate carry/lifts             Step Ups  *6" x10 fwd   2x pinch 4 " x10 fwd  Side x2 pinch held          Mini squats             Up/Down stairs             Chair squat  *x10 Feet staggered x10          Gait Training             Mirror walk             Hurdles OBO             Heel toe amb    10ft x 4 counter         Sidestepping  *10 ft x4 10ft x4          Braiding             Modalities             CP/MHP ankle                            Access Code: [email or web address]  URL: [email or web address]  Date: 03/21/2022  Prepared by: Pa Mccullough    Exercises  · Seated Gastroc Stretch with Strap - 2 x daily - 7 x weekly - 1 sets - 4 reps - 30 hold  · Seated Heel Raise - 2 x daily - 7 x weekly - 3 sets - 10 reps - 2 hold  · Seated Soleus Stretch with Strap - 2 x daily - 7 x weekly - 1 sets - 4 reps - 30 hold  · Seated Toe Raise - 2 x daily - 7 x weekly - 3 sets - 10 reps - 2 hold  · Supine Ankle Circles - 2 x daily - 7 x weekly - 3 sets - 10 reps  · Seated Great Toe Extension - 2 x daily - 7 x weekly - 3 sets - 10 reps - 2 hold  · Seated Lesser Toes Extension - 2 x daily - 7 x weekly - 3 sets - 10 reps - 2 hold  · Towel Scrunches - 2 x daily - 7 x weekly - 3 sets - 10 reps - 2 hold  · Seated Arch Lifts - 2 x daily - 7 x weekly - 3 sets - 10 reps - 2 hold  · Seated [place] Pick-Up with Toes - 1 x daily - 7 x weekly - 3 sets - 10 reps  · Supine Bridge - 2 x daily - 7 x weekly - 1 sets - 10 reps - 3 sec hold  · Supine Straight Leg Raises - 2 x daily - 7 x weekly - 1 sets - 10 reps  · Clamshell - 2 x daily - 7 x weekly - 1 sets - 10 reps - 3 sec hold  · Ankle Inversion Eversion Towel Slide - 2 x daily - 7 x weekly - 1 sets - 10 reps  · Sit to Stand - 2 x daily - 7 x weekly - 1 sets - 10 reps  · Side Stepping with Counter Support - 2 x daily - 7 x weekly - 3 sets - 10 reps  · Ankle Dorsiflexion with Resistance - 2 x daily - 7 x weekly - 1 sets - 10 reps  · Ankle Eversion with Resistance - 2 x daily - 7 x weekly - 1 sets - 10 reps  · Seated Ankle Inversion with Resistance and Legs Crossed - 2 x daily - 7 x weekly - 1 sets - 10 reps  · Seated Ankle Plantarflexion with Resistance - 2 x daily - 7 x weekly - 1 sets - 10 reps

## 2022-04-04 ENCOUNTER — OFFICE VISIT (OUTPATIENT)
Dept: PHYSICAL THERAPY | Facility: CLINIC | Age: 57
End: 2022-04-04
Payer: COMMERCIAL

## 2022-04-04 DIAGNOSIS — S86.312D PERONEAL TENDON TEAR, LEFT, SUBSEQUENT ENCOUNTER: ICD-10-CM

## 2022-04-04 DIAGNOSIS — S99.912D INJURY OF LEFT ANKLE, SUBSEQUENT ENCOUNTER: ICD-10-CM

## 2022-04-04 DIAGNOSIS — S93.492D SPRAIN OF ANTERIOR TALOFIBULAR LIGAMENT OF LEFT ANKLE, SUBSEQUENT ENCOUNTER: Primary | ICD-10-CM

## 2022-04-04 PROCEDURE — 97110 THERAPEUTIC EXERCISES: CPT | Performed by: PHYSICAL THERAPIST

## 2022-04-04 PROCEDURE — 97112 NEUROMUSCULAR REEDUCATION: CPT | Performed by: PHYSICAL THERAPIST

## 2022-04-04 PROCEDURE — 97140 MANUAL THERAPY 1/> REGIONS: CPT | Performed by: PHYSICAL THERAPIST

## 2022-04-04 PROCEDURE — 97116 GAIT TRAINING THERAPY: CPT | Performed by: PHYSICAL THERAPIST

## 2022-04-04 NOTE — PROGRESS NOTES
Daily Note     Today's date: 2022  Patient name: Cindy Linn  : 1965  MRN: [id number]  Referring provider: Charlie Sheffield MD  Dx:   Encounter Diagnosis     ICD-10-CM    1  Sprain of anterior talofibular ligament of left ankle, subsequent encounter  S93 492D    2  Injury of left ankle, subsequent encounter  S99 912D    3  Peroneal tendon tear, left, subsequent encounter  S86 312D                   Subjective: The patient notes feeling increased pain over the weekend - 1 good day after therapy and then had excruciating pain Friday evening and Saturday at his L medial malleolus  The patient rested  and now presently feels 3/10 pain at the L medial malleolus  Objective: See treatment diary below      Assessment: The patient tolerated the treatment well today  The patient denies pain at the end of the treatment  The patient needs a lateral post to stretch into DF without L ankle joint pinching  The patient was encouraged to perform the stretches more often as there are days when he does not do any  The patient was also advised to acquire an ankle support  The patient will be re-assessed next visit for probable discharge  Plan: Progress note during next visit  Potential discharge next visit  The patient is requesting DC due to having shoulder surgery       Precautions: WBAT L LE IN BOOT X 2 WEEK (3/30)F/B ANKLE SUPPORT  RE:   Access Code: [facility]       Manuals 3/16 3/21 3/28 3/31 4/4        Gentle Stretching ankle all direction  *stretches performed stretches performed Done AD Done AD        Midfoot,rearfootTC, forefoot mobs  stretches performed stretches performed Done AD Done AD        Toe stretch                          Neuro Re-Ed             Toe Yoga x10 x10 x10          [place]   *x2 min x2 min X 2 min                      Back ext   x10 review         Tandem Balance    2x:30 2x:30        Towel scrunches x10 x10 x10          bridges  *x10 x10 review         SLR all planes  *x10 2x5 review         Clam shell  *x10 x10 review         Fitter/balance board             BAPS taps,circles  *L2 x10 ea L2 x10 ea L2 x 20 ea L2 x20        Steam Boats             Arch lifts x10 x10 x10          Foam balance             Ther Ex             Ankle ABC's, circles cw,ccw x15 ea HEP           HR/TR sit/stand Sit x15 Sit x15 Sit x10 stand Stand x15        INV/EV wipes  *x10 x10          Bike S=14    L1 x 10 mins         Nu step  *L1 x5 min L2 x8 min  L2 x 10 mins        Strap soleus 4x:30 :30x3 :30x3 Step x10 p!p! Step w/ lateral post 5x:15        Gastroc stretch 4x:30 :30x3 :30x3 Stand 3x:30 w/ lateral post at foot 4x:30 w/ lateral post        T-band EX all  *red x10 ea Red x10 gentle GRN  x15 GRN x30        Ther Activity             Crate carry/lifts             Step Ups  *6" x10 fwd   2x pinch 4 " x10 fwd  Side x2 pinch held  4" x10 fwd  x8 side p! Mini squats             Up/Down stairs             Chair squat  *x10 Feet staggered x10          Gait Training             Mirror walk             Hurdles OBO     Over 25ft x 2 fwd/ side        Heel toe amb    10ft x 4 counter 10ft x 4        Sidestepping  *10 ft x4 10ft x4          Braiding             Modalities             CP/MHP ankle                            Access Code: [email or web address]  URL: [email or web address]  Date: 03/21/2022  Prepared by: Chey Mccullough    Exercises  · Seated Gastroc Stretch with Strap - 2 x daily - 7 x weekly - 1 sets - 4 reps - 30 hold  · Seated Heel Raise - 2 x daily - 7 x weekly - 3 sets - 10 reps - 2 hold  · Seated Soleus Stretch with Strap - 2 x daily - 7 x weekly - 1 sets - 4 reps - 30 hold  · Seated Toe Raise - 2 x daily - 7 x weekly - 3 sets - 10 reps - 2 hold  · Supine Ankle Circles - 2 x daily - 7 x weekly - 3 sets - 10 reps  · Seated Great Toe Extension - 2 x daily - 7 x weekly - 3 sets - 10 reps - 2 hold  · Seated Lesser Toes Extension - 2 x daily - 7 x weekly - 3 sets - 10 reps - 2 hold  · Towel Scrunches - 2 x daily - 7 x weekly - 3 sets - 10 reps - 2 hold  · Seated Arch Lifts - 2 x daily - 7 x weekly - 3 sets - 10 reps - 2 hold  · Seated [place] Pick-Up with Toes - 1 x daily - 7 x weekly - 3 sets - 10 reps  · Supine Bridge - 2 x daily - 7 x weekly - 1 sets - 10 reps - 3 sec hold  · Supine Straight Leg Raises - 2 x daily - 7 x weekly - 1 sets - 10 reps  · Clamshell - 2 x daily - 7 x weekly - 1 sets - 10 reps - 3 sec hold  · Ankle Inversion Eversion Towel Slide - 2 x daily - 7 x weekly - 1 sets - 10 reps  · Sit to Stand - 2 x daily - 7 x weekly - 1 sets - 10 reps  · Side Stepping with Counter Support - 2 x daily - 7 x weekly - 3 sets - 10 reps  · Ankle Dorsiflexion with Resistance - 2 x daily - 7 x weekly - 1 sets - 10 reps  · Ankle Eversion with Resistance - 2 x daily - 7 x weekly - 1 sets - 10 reps  · Seated Ankle Inversion with Resistance and Legs Crossed - 2 x daily - 7 x weekly - 1 sets - 10 reps  · Seated Ankle Plantarflexion with Resistance - 2 x daily - 7 x weekly - 1 sets - 10 reps

## 2022-04-06 NOTE — PROGRESS NOTES
PT Re-Evaluation  and PT Discharge    Today's date: 2022  Patient name: Alexis Piedra  : 1965  MRN: [id number]  Referring provider: Ventura Aguayo MD  Dx:   Encounter Diagnosis     ICD-10-CM    1  Sprain of anterior talofibular ligament of left ankle, subsequent encounter  S93 492D    2  Injury of left ankle, subsequent encounter  S99 912D    3  Peroneal tendon tear, left, subsequent encounter  S86 312D                   Assessment  Assessment details: Alexis Piedra is a 55 y o  male with a history of DM, Hx sleeve gastrectomy, HTN, GERD, neuropathy, and L shoulder impingement that presents for a physical therapy RE evaluation/ DISCHARGE  The patient has attended 6 sessions of skilled PT  The patient demonstrates signs and symptoms consistent with left ankle ATFL sprain, L peroneal tendon tear, and L ankle injury  During the examination the patient demonstrated improved but decreased L ankle strength, improved but decreased L ankle ROM, improved gait, and decreased L ankle pain  The patient has made functional gains since starting therapy  The patient is now able to walk and stand for prolonged periods of time without pain  The patient is walking on unlevel surfaces with much less pain  The patient has not attempted to run/jump  The patient is requesting discharge to an independent HEP for the ankle due to having an upcoming shoulder surgery  DC formal PT for the L ankle at this time  Symptom irritability: lowUnderstanding of Dx/Px/POC: fair   Prognosis: fair    Goals  STG: Achieve in 4-6 weeks  1  Patient's L ankle pain at worst less than 2/10 to allow for proper gait  NOT MET  2  Patient's L ankle ROM improve by 10-15 degrees to improve prolonged ambulation  MET  3   L LE MMT improve to > 4+/5 all motions tested to improve walking tolerance/recreational activities  NOT MET      LTG:  Achieve in 6-12 weeks  1    Patient's ankle FOTO score improve to  > 73% to indicate a return to PLOF  NOT MET  2  Patient achieve personal goal of decreasing L ankle pain to less than 2/10 with all activities  NOT MET  3  Patient to achieve independence with home exercise plan  MET  4  Patient single leg stand on L LE > 30 seconds without LOB to indicate a return of normal balance  NOT MET        Plan  Plan details: DC PT TO AN INDEPENDENT HEP  Treatment plan discussed with: PTA and patient        Subjective Evaluation    History of Present Illness  Date of onset: 1/12/2022  Mechanism of injury: SUBJECTIVE: 4/7/2022: The patient reports having no pain at the left ankle since last session  The patient is able to stand/walk without difficulty  The patient is tolerating most functional activities at this time  He feels he has achieved his goals of decreasing the L ankle pain  The patient feels independent with his HEP and would like to discharge formal PT  The patient has an upcoming shoulder surgery to prepare for  INJURY HISTORY: Trini Lawson is a 55 y o  male that presents to outpatient physical therapy with complaints of left ankle pain  The patient reports onset 8 weeks ago via inversion ankle sprain after stepping in a hole on the floor causing the ankle to roll  The patient attempted rest and elevation x 6 weeks without much change  The pain persisted so the patient sought medical care  The patient saw orthopedics who ordered a L ankle MRI which revealed a sprained ATFL, and torn peroneal tendons  The patient was prescribed a cam boot for the next 2 weeks followed by transition to an ankle support  The patient was referred to outpatient PT  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[employer] - he notes limitation with prolonged walking, standing, difficulty walking on unlevel ground, difficulty performing work tasks  The patient's main goal for physical therapy is to have less pain at the left ankle       MRI L ankle : IMPRESSION:     No acute fracture      Longitudinal split tear of the peroneus brevis tendon      Tear of the anterior talofibular ligament      Moderate tibiotalar osteoarthritis  Pain  Current pain ratin  At best pain ratin  At worst pain rating: 3  Location: L anterior ankle joint line  Quality: sharp, discomfort and pulling  Relieving factors: rest  Aggravating factors: standing, walking and running  Progression: improved    Social Support  Steps to enter house: yes  1  Lives in: [facility]    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[employer])  Hand dominance: right      Diagnostic Tests  MRI studies: abnormal  Treatments  Previous treatment: physical therapy  Patient Goals  Patient goal: decrease pain( met)        Objective     Palpation     Additional Palpation Details  NO TTP    Tenderness   Left Ankle/Foot   No tenderness in the Achilles insertion, anterior ankle, anterior talofibular ligament, calcaneofibular ligament, lateral malleolus, medial malleolus and plantar fascia       Neurological Testing     Sensation     Ankle/Foot   Left Ankle/Foot   Intact: light touch    Right Ankle/Foot   Intact: light touch     Active Range of Motion   Left Ankle/Foot   Dorsiflexion (ke): 10 degrees   Dorsiflexion (kf): 12 degrees   Plantar flexion: 40 degrees   Inversion: 40 degrees   Eversion: 12 degrees   Great toe flexion: WFL  Great toe extension: WFL  Lesser toes: WFL    Right Ankle/Foot   Dorsiflexion (ke): 12 degrees   Dorsiflexion (kf): 16 degrees   Plantar flexion: 50 degrees   Inversion: 34 degrees   Eversion: 32 degrees   Great toe flexion: WFL  Great toe extension: WFL  Lesser toes: WFL    Passive Range of Motion   Left Ankle/Foot    Dorsiflexion (ke): 10 degrees   Dorsiflexion (kf): 12 degrees   Plantar flexion: 40 degrees   Inversion: 36 degrees   Eversion: 12 degrees     Right Ankle/Foot    Dorsiflexion (ke): 14 degrees   Dorsiflexion (kf): 18 degrees    Plantar flexion: 50 degrees   Inversion: 34 degrees   Eversion: 18 degrees   Great toe flexion: WFL  Great toe extension: WFL  Lesser toes: WFL    Additional Passive Range of Motion Details  IMPROVED ALL AND NO PAIN    Strength/Myotome Testing     Left Ankle/Foot   Dorsiflexion: 4  Plantar flexion: 4  Inversion: 4  Eversion: 4  Great toe flexion: 4  Great toe extension: 4    Right Ankle/Foot   Dorsiflexion: 5  Plantar flexion: 5  Inversion: 5  Eversion: 5  Great toe flexion: 5  Great toe extension: 5    Tests   Left Ankle/Foot   Valgus: 65  Additional Tests Details  FOTO: 65% ( predicted 73%) ( improved 6%)    Gait impairments: Patient ambulates with no AD WBAT L LE in a cam boot  The patient demonstrates slow gait speed, unequal step lengths, and decreased heel-toe rollover   (+) antalgic gait with L LE limp    TU seconds ( 3 seconds)    Patient now SLB on L LE for 3 seconds prior to LOB in a sneaker as opposed to wearing a cam boot    Swelling   Left Ankle/Foot   [place]: 26 5 cm    Right Ankle/Foot   [place]: 25 cm             Precautions: WBAT L LE IN BOOT X 2 WEEK (3/30)F/B ANKLE SUPPORT  RE:   Access Code: [facility]       Manuals 3/16 3/21 3/28 3/31 4/4 4/7       Gentle Stretching ankle all direction  *stretches performed stretches performed Done AD Done AD        Midfoot,rearfootTC, forefoot mobs  stretches performed stretches performed Done AD Done AD        Toe stretch                          Neuro Re-Ed             Toe Yoga x10 x10 x10          [place]   *x2 min x2 min X 2 min         Lace up brace donning and fitting      Done AD       Back ext   x10 review         Tandem Balance    2x:30 2x:30 SLB L 1 x :10       Towel scrunches x10 x10 x10          bridges  *x10 x10 review         SLR all planes  *x10 2x5 review         Clam shell  *x10 x10 review         Fitter/balance board             BAPS taps,circles  *L2 x10 ea L2 x10 ea L2 x 20 ea L2 x20        Steam Boats             Arch lifts x10 x10 x10          Foam balance             Ther Ex             Ankle ABC's, circles cw,ccw x15 ea HEP           HR/TR sit/stand Sit x15 Sit x15 Sit x10 stand Stand x15        INV/EV wipes  *x10 x10          Bike S=14    L1 x 10 mins         Nu step  *L1 x5 min L2 x8 min  L2 x 10 mins L2 x 10 mins       Strap soleus 4x:30 :30x3 :30x3 Step x10 p!p! Step w/ lateral post 5x:15 reviewed       Gastroc stretch 4x:30 :30x3 :30x3 Stand 3x:30 w/ lateral post at foot 4x:30 w/ lateral post reviewed       T-band EX all  *red x10 ea Red x10 gentle GRN  x15 GRN x30        Ther Activity             Crate carry/lifts             Step Ups  *6" x10 fwd   2x pinch 4 " x10 fwd  Side x2 pinch held  4" x10 fwd  x8 side p!         Mini squats             Up/Down stairs             Chair squat  *x10 Feet staggered x10          Gait Training             Mirror walk             Hurdles OBO     Over 25ft x 2 fwd/ side        Heel toe amb    10ft x 4 counter 10ft x 4        Sidestepping  *10 ft x4 10ft x4          Braiding             Modalities             CP/MHP ankle                            Access Code: [facility]

## 2022-04-07 ENCOUNTER — EVALUATION (OUTPATIENT)
Dept: PHYSICAL THERAPY | Facility: CLINIC | Age: 57
End: 2022-04-07
Payer: COMMERCIAL

## 2022-04-07 DIAGNOSIS — S99.912D INJURY OF LEFT ANKLE, SUBSEQUENT ENCOUNTER: ICD-10-CM

## 2022-04-07 DIAGNOSIS — S93.492D SPRAIN OF ANTERIOR TALOFIBULAR LIGAMENT OF LEFT ANKLE, SUBSEQUENT ENCOUNTER: Primary | ICD-10-CM

## 2022-04-07 DIAGNOSIS — S86.312D PERONEAL TENDON TEAR, LEFT, SUBSEQUENT ENCOUNTER: ICD-10-CM

## 2022-04-07 PROCEDURE — 97110 THERAPEUTIC EXERCISES: CPT | Performed by: PHYSICAL THERAPIST

## 2022-04-07 PROCEDURE — 97112 NEUROMUSCULAR REEDUCATION: CPT | Performed by: PHYSICAL THERAPIST

## 2022-04-11 ENCOUNTER — APPOINTMENT (OUTPATIENT)
Dept: PHYSICAL THERAPY | Facility: CLINIC | Age: 57
End: 2022-04-11
Payer: COMMERCIAL

## 2022-04-14 ENCOUNTER — APPOINTMENT (OUTPATIENT)
Dept: PHYSICAL THERAPY | Facility: CLINIC | Age: 57
End: 2022-04-14
Payer: COMMERCIAL

## 2022-04-14 ENCOUNTER — ANESTHESIA EVENT (OUTPATIENT)
Dept: PERIOP | Facility: HOSPITAL | Age: 57
End: 2022-04-14
Payer: COMMERCIAL

## 2022-04-14 NOTE — PRE-PROCEDURE INSTRUCTIONS
Pre-Surgery Instructions:   Medication Instructions    insulin glargine (LANTUS) 100 units/mL subcutaneous injection Take day of surgery   LISINOPRIL PO Hold day of surgery   metFORMIN (GLUCOPHAGE) 500 mg tablet Hold day of surgery   pioglitazone (ACTOS) 30 mg tablet Hold day of surgery  You will receive a phone call from hospital for arrival time  Please call surgeons office if any changes in your condition  Wear easy on/off clothing; consider type of surgery;  Valuables, jewelry, piercing's please keep at home  **COVID-19  education/surgical guidelines  Updated covid    Visitation policy  Please: No contact lenses or eye make up, artificial eyelashes    Please bring special ordered sling or braces if needed for  Your particular surgery  -pt will check with office if needed    Please secure transportation     Follow pre surgery showering or cleaning instructions as  Reviewed by nurse or surgeons office      Questions answered and concerns addressed

## 2022-04-15 ENCOUNTER — APPOINTMENT (OUTPATIENT)
Dept: LAB | Facility: CLINIC | Age: 57
End: 2022-04-15
Payer: COMMERCIAL

## 2022-04-15 DIAGNOSIS — Z01.812 PRE-OPERATIVE LABORATORY EXAMINATION: ICD-10-CM

## 2022-04-15 LAB
ALBUMIN SERPL BCP-MCNC: 3.5 G/DL (ref 3.5–5)
ALP SERPL-CCNC: 81 U/L (ref 46–116)
ALT SERPL W P-5'-P-CCNC: 27 U/L (ref 12–78)
ANION GAP SERPL CALCULATED.3IONS-SCNC: 3 MMOL/L (ref 4–13)
AST SERPL W P-5'-P-CCNC: 14 U/L (ref 5–45)
BILIRUB SERPL-MCNC: 0.42 MG/DL (ref 0.2–1)
BUN SERPL-MCNC: 19 MG/DL (ref 5–25)
CALCIUM SERPL-MCNC: 9.6 MG/DL (ref 8.3–10.1)
CHLORIDE SERPL-SCNC: 109 MMOL/L (ref 100–108)
CO2 SERPL-SCNC: 29 MMOL/L (ref 21–32)
CREAT SERPL-MCNC: 1.65 MG/DL (ref 0.6–1.3)
EST. AVERAGE GLUCOSE BLD GHB EST-MCNC: 194 MG/DL
GFR SERPL CREATININE-BSD FRML MDRD: 46 ML/MIN/1.73SQ M
GLUCOSE P FAST SERPL-MCNC: 120 MG/DL (ref 65–99)
HBA1C MFR BLD: 8.4 %
POTASSIUM SERPL-SCNC: 5.1 MMOL/L (ref 3.5–5.3)
PROT SERPL-MCNC: 7.5 G/DL (ref 6.4–8.2)
SODIUM SERPL-SCNC: 141 MMOL/L (ref 136–145)

## 2022-04-15 PROCEDURE — 83036 HEMOGLOBIN GLYCOSYLATED A1C: CPT

## 2022-04-15 PROCEDURE — 80053 COMPREHEN METABOLIC PANEL: CPT

## 2022-04-15 PROCEDURE — 36415 COLL VENOUS BLD VENIPUNCTURE: CPT

## 2022-04-19 ENCOUNTER — TELEPHONE (OUTPATIENT)
Dept: OBGYN CLINIC | Facility: HOSPITAL | Age: 57
End: 2022-04-19

## 2022-04-19 NOTE — TELEPHONE ENCOUNTER
Patient sees Dr Krista Singh  Patient is calling in stating that he is scheduled to have surgery tomorrow for his left shoulder  The patient was waiting for a call back from the  or his PA he is wanting to know if he is going to be immobile, his recovery time, how long he will be out of work  Patient is wanting to make sure he has a very clear and good understanding before going into surgery tomorrow  Please reach out to him as soon as possible        Call back# [phone]

## 2022-04-19 NOTE — TELEPHONE ENCOUNTER
As far as any immobilization, it depends on what happens with the surgery  If it is a pure decompression, the sling will be discontinued immediately and can start range of motion at the same time  If there is a rotator cuff issue, may be immobilized for approximately 6 weeks  Unfortunately, I will not know till the surgery commences    Based on his clinical findings, the odds are it will just be a decompression and motion will be initiated immediately

## 2022-04-19 NOTE — TELEPHONE ENCOUNTER
Spoke to patient  He was given the above message from Dr Julio C Madera  Understanding was verbalized  No further questions at this time

## 2022-04-20 ENCOUNTER — HOSPITAL ENCOUNTER (OUTPATIENT)
Facility: HOSPITAL | Age: 57
Setting detail: OUTPATIENT SURGERY
Discharge: HOME | End: 2022-04-20
Attending: ORTHOPAEDIC SURGERY | Admitting: ORTHOPAEDIC SURGERY
Payer: COMMERCIAL

## 2022-04-20 ENCOUNTER — ANESTHESIA (OUTPATIENT)
Dept: PERIOP | Facility: HOSPITAL | Age: 57
End: 2022-04-20
Payer: COMMERCIAL

## 2022-04-20 VITALS
TEMPERATURE: 97.6 F | OXYGEN SATURATION: 98 % | RESPIRATION RATE: 18 BRPM | HEART RATE: 69 BPM | WEIGHT: 255 LBS | DIASTOLIC BLOOD PRESSURE: 68 MMHG | BODY MASS INDEX: 37.77 KG/M2 | HEIGHT: 69 IN | SYSTOLIC BLOOD PRESSURE: 113 MMHG

## 2022-04-20 LAB
FLUAV RNA RESP QL NAA+PROBE: NEGATIVE
FLUBV RNA RESP QL NAA+PROBE: NEGATIVE
GLUCOSE SERPL-MCNC: 154 MG/DL (ref 65–140)
RSV RNA RESP QL NAA+PROBE: NEGATIVE
SARS-COV-2 RNA RESP QL NAA+PROBE: NEGATIVE

## 2022-04-20 PROCEDURE — NC001 PR NO CHARGE: Performed by: PHYSICIAN ASSISTANT

## 2022-04-20 PROCEDURE — 0241U HB NFCT DS VIR RESP RNA 4 TRGT: CPT | Performed by: STUDENT IN AN ORGANIZED HEALTH CARE EDUCATION/TRAINING PROGRAM

## 2022-04-20 PROCEDURE — 82948 REAGENT STRIP/BLOOD GLUCOSE: CPT

## 2022-04-20 RX ORDER — SODIUM CHLORIDE, SODIUM LACTATE, POTASSIUM CHLORIDE, CALCIUM CHLORIDE 600; 310; 30; 20 MG/100ML; MG/100ML; MG/100ML; MG/100ML
125 INJECTION, SOLUTION INTRAVENOUS CONTINUOUS
Status: DISCONTINUED | OUTPATIENT
Start: 2022-04-20 | End: 2022-04-21 | Stop reason: HOSPADM

## 2022-04-20 RX ORDER — LIDOCAINE HYDROCHLORIDE 10 MG/ML
0.5 INJECTION, SOLUTION EPIDURAL; INFILTRATION; INTRACAUDAL; PERINEURAL ONCE AS NEEDED
Status: DISCONTINUED | OUTPATIENT
Start: 2022-04-20 | End: 2022-04-21 | Stop reason: HOSPADM

## 2022-04-20 RX ORDER — CHLORHEXIDINE GLUCONATE 4 G/100ML
SOLUTION TOPICAL DAILY PRN
Status: DISCONTINUED | OUTPATIENT
Start: 2022-04-20 | End: 2022-04-21 | Stop reason: HOSPADM

## 2022-04-20 RX ORDER — CEFAZOLIN SODIUM 2 G/50ML
2000 SOLUTION INTRAVENOUS ONCE
Status: DISCONTINUED | OUTPATIENT
Start: 2022-04-20 | End: 2022-04-21 | Stop reason: HOSPADM

## 2022-04-20 NOTE — H&P
H&P Exam - Orthopedics   Edith Santamaria 55 y o  male MRN: [id number]  Unit/Bed#:  Encounter: [id number]    Assessment/Plan     Assessment:  Impingement syndrome left shoulder  Plan:  Elective left shoulder arthroscopy with possible acromioplasty    History of Present Illness   HPI:  Edith Santamaria is a 55 y o  male who presented to our office complaining of worsening left shoulder pain  The patient stated that his symptoms were continuing to worsen starting to affect his quality of life  X-rays were performed which were consistent with a large subacromial spur  After exhausting conservative treatment, the risks and benefits surgery discussed with the patient  He decided on an elective left shoulder arthroscopy with possible acromioplasty  Review of Systems   Constitutional: Negative for chills, fever and unexpected weight change  HENT: Negative for nosebleeds and sore throat  Eyes: Negative for pain, redness and visual disturbance  Respiratory: Negative for cough, shortness of breath and wheezing  Cardiovascular: Negative for chest pain, palpitations and leg swelling  Gastrointestinal: Negative for abdominal pain, nausea and vomiting  Endocrine: Negative for polydipsia and polyuria  Genitourinary: Negative for dysuria and hematuria  Musculoskeletal: Positive for arthralgias and myalgias  As noted in HPI   Skin: Negative for rash and wound  Neurological: Negative for dizziness, numbness and headaches  Psychiatric/Behavioral: Negative for decreased concentration and suicidal ideas  The patient is not nervous/anxious          Historical Information   Past Medical History:   Diagnosis Date    Diabetes mellitus ([facility] [address] )     History of sleeve gastrectomy     Hypertension     Left ankle swelling     states thinks he sprained- swelling x 2 wks - surgeon office notified    Left shoulder pain     Mild acid reflux     Neuropathy     in feet yao    Shoulder impingement, left Past Surgical History:   Procedure Laterality Date    GASTRECTOMY SLEEVE LAPAROSCOPIC      LAPAROSCOPIC GASTRIC BANDING       Social History   Social History     Substance and Sexual Activity   Alcohol Use Not Currently    Comment: rarely     Social History     Substance and Sexual Activity   Drug Use Never     Social History     Tobacco Use   Smoking Status Never Smoker   Smokeless Tobacco Never Used     Family History: non-contributory    Meds/Allergies   all medications and allergies reviewed  No Known Allergies    Objective   Vitals: Height 5' 9" (1 753 m), weight 116 kg (255 lb)  ,Body mass index is 37 66 kg/m²  No intake or output data in the 24 hours ending 04/20/22 1016    No intake/output data recorded  Invasive Devices  Report    None                 Physical Exam  Ortho Exam  Left upper extremity is neurovascularly intact  Fingers are pink and mobile  Compartments are soft  Range of motion of the shoulder is from 0-165 degrees of forward flexion abduction  Rotator cuff strength 5/5  No apprehension  Brisk cap refill  Sensation intact  Lungs CTA  Heart RRR  Signs of impingement  Lab Results: I have personally reviewed pertinent lab results  Imaging: I have personally reviewed pertinent reports  EKG, Pathology, and Other Studies: I have personally reviewed pertinent reports  Code Status: No Order  Advance Directive and Living Will:      Power of :    POLST:      Counseling / Coordination of Care  Total floor / unit time spent today 30 minutes  Greater than 50% of total time was spent with the patient and / or family counseling and / or coordination of care

## 2022-04-20 NOTE — ANESTHESIA PREPROCEDURE EVALUATION
Procedure:  SHOULDER ARTHROSCOPY WITH ACROMIOPLASTY (Left Shoulder)    Plan for interscalene nerve block    Relevant Problems   CARDIO   (+) Hypertension      Other   (+) Diabetes mellitus (HCC)             Anesthesia Plan  ASA Score- 3     Anesthesia Type- general with ASA Monitors  Additional Monitors:   Airway Plan: ETT  Comment: Risks/benefits and alternatives discussed with patient including likely possibility of PONV and sore throat, as well as the rare possibilities of aspiration, dental/oropharyngeal/ocular injuries, or grave/life threatening anesthetic and surgical emergencies          Plan Factors-Exercise tolerance (METS): >4 METS  Patient summary reviewed  Patient instructed to abstain from smoking on day of procedure  Patient did not smoke on day of surgery  Induction- intravenous  Postoperative Plan- Plan for postoperative opioid use  Planned trial extubation    Informed Consent- Anesthetic plan and risks discussed with patient  I personally reviewed this patient with the CRNA  Discussed and agreed on the Anesthesia Plan with the CRNA  Suhas Morris

## 2022-06-01 ENCOUNTER — OFFICE VISIT (OUTPATIENT)
Dept: OBGYN CLINIC | Facility: CLINIC | Age: 57
End: 2022-06-01
Payer: COMMERCIAL

## 2022-06-01 VITALS
HEART RATE: 84 BPM | TEMPERATURE: 97.5 F | SYSTOLIC BLOOD PRESSURE: 95 MMHG | WEIGHT: 258 LBS | HEIGHT: 69 IN | DIASTOLIC BLOOD PRESSURE: 67 MMHG | BODY MASS INDEX: 38.21 KG/M2

## 2022-06-01 DIAGNOSIS — M19.072 ARTHRITIS OF LEFT ANKLE: ICD-10-CM

## 2022-06-01 DIAGNOSIS — S99.912D INJURY OF LEFT ANKLE, SUBSEQUENT ENCOUNTER: ICD-10-CM

## 2022-06-01 DIAGNOSIS — S86.312A TEAR OF PERONEAL TENDON OF LEFT FOOT: ICD-10-CM

## 2022-06-01 DIAGNOSIS — S93.492D SPRAIN OF ANTERIOR TALOFIBULAR LIGAMENT OF LEFT ANKLE, SUBSEQUENT ENCOUNTER: Primary | ICD-10-CM

## 2022-06-01 PROCEDURE — 99214 OFFICE O/P EST MOD 30 MIN: CPT | Performed by: FAMILY MEDICINE

## 2022-06-01 NOTE — PATIENT INSTRUCTIONS
F/u here as needed  Referral to Podiatry  OA vs pain from chronic ankle sprain  Icing/heat/OTC pain meds as needed    Ankle brace

## 2022-06-01 NOTE — PROGRESS NOTES
[facility] [place]  [address]  [facility] [address] [place] [facility] [address]  [phone]      Chief Complaint:  Chief Complaint   Patient presents with    Left Ankle - Follow-up       Vitals:  BP 95/67   Pulse 84   Temp 97 5 °F (36 4 °C) (Tympanic)   Ht 5' 9" (1 753 m)   Wt 117 kg (258 lb)   BMI 38 10 kg/m²     The following portions of the patient's history were reviewed and updated as appropriate: allergies, current medications, past family history, past medical history, past social history, past surgical history, and problem list       Subjective:   Patient ID: Precious Huerta is a 65 y o  male  Here for f/u  L ankle pain/complete tear of ATFL  Didn't follow up due to schedule  Went to therapy for  6 wks  Stopped wearing boot about 6-8 wks- less pain  He was getting better  Began walking without boot  Some days no pain/other days unbearable  Most days hurts to walk  Not wearing boot today  No pain meds  Sharp pain  Better at rest       Review of Systems   Constitutional: Negative for fatigue and fever  Respiratory: Negative for shortness of breath  Cardiovascular: Negative for chest pain  Gastrointestinal: Negative for abdominal pain and nausea  Genitourinary: Negative for dysuria  Musculoskeletal: Positive for arthralgias  Skin: Negative for rash and wound  Neurological: Negative for weakness and headaches  Objective:  Left Ankle Exam     Tenderness   The patient is experiencing tenderness in the deltoid (talar dome TTP)  Range of Motion   The patient has normal left ankle ROM  Muscle Strength   The patient has normal left ankle strength  Comments:  Pain with inversion  Neg cat/squeeze test          Strength/Myotome Testing     Left Ankle/Foot   Normal strength      Physical Exam  Constitutional:       Appearance: Normal appearance  He is normal weight  Eyes:      Extraocular Movements: Extraocular movements intact     Pulmonary:      Effort: Pulmonary effort is normal    Musculoskeletal:         General: Tenderness present  Cervical back: Normal range of motion  Skin:     General: Skin is warm and dry  Neurological:      General: No focal deficit present  Mental Status: He is alert and oriented to person, place, and time  Mental status is at baseline  Psychiatric:         Mood and Affect: Mood normal          Behavior: Behavior normal          Thought Content: Thought content normal          Judgment: Judgment normal                Assessment/Plan:  Assessment/Plan   Diagnoses and all orders for this visit:    Sprain of anterior talofibular ligament of left ankle, subsequent encounter  -     Ambulatory Referral to Podiatry; Future    Tear of peroneal tendon of left foot  -     Ambulatory Referral to Podiatry; Future    Injury of left ankle, subsequent encounter  -     Ambulatory Referral to Podiatry; Future    Arthritis of left ankle  -     Ambulatory Referral to Podiatry; Future        Return for f/u with Podiatry, Ness Hobbs MD

## 2022-06-07 ENCOUNTER — APPOINTMENT (OUTPATIENT)
Dept: RADIOLOGY | Facility: CLINIC | Age: 57
End: 2022-06-07
Payer: COMMERCIAL

## 2022-06-07 ENCOUNTER — OFFICE VISIT (OUTPATIENT)
Dept: PODIATRY | Facility: CLINIC | Age: 57
End: 2022-06-07
Payer: COMMERCIAL

## 2022-06-07 VITALS — WEIGHT: 258 LBS | BODY MASS INDEX: 38.21 KG/M2 | HEIGHT: 69 IN

## 2022-06-07 DIAGNOSIS — S99.912D INJURY OF LEFT ANKLE, SUBSEQUENT ENCOUNTER: ICD-10-CM

## 2022-06-07 DIAGNOSIS — S93.492D SPRAIN OF ANTERIOR TALOFIBULAR LIGAMENT OF LEFT ANKLE, SUBSEQUENT ENCOUNTER: ICD-10-CM

## 2022-06-07 DIAGNOSIS — S86.312A TEAR OF PERONEAL TENDON OF LEFT FOOT: ICD-10-CM

## 2022-06-07 DIAGNOSIS — M19.072 ARTHRITIS OF LEFT ANKLE: ICD-10-CM

## 2022-06-07 PROCEDURE — 73630 X-RAY EXAM OF FOOT: CPT

## 2022-06-07 PROCEDURE — 20605 DRAIN/INJ JOINT/BURSA W/O US: CPT | Performed by: PODIATRIST

## 2022-06-07 PROCEDURE — 99244 OFF/OP CNSLTJ NEW/EST MOD 40: CPT | Performed by: PODIATRIST

## 2022-06-07 NOTE — PROGRESS NOTES
HISTORY AND PHYSICAL EXAM  - [facility]    PATIENT:  Sara Altamirano    1965      Assessment/Plan     Problem List Items Addressed This Visit    None     Visit Diagnoses     Sprain of anterior talofibular ligament of left ankle, subsequent encounter        Tear of peroneal tendon of left foot        Injury of left ankle, subsequent encounter        Relevant Orders    X-ray foot left 3+ views (Completed)    Arthritis of left ankle               Diagnoses and all orders for this visit:    Sprain of anterior talofibular ligament of left ankle, subsequent encounter  -     Ambulatory Referral to Podiatry    Tear of peroneal tendon of left foot  -     Ambulatory Referral to Podiatry    Injury of left ankle, subsequent encounter  -     Ambulatory Referral to Podiatry  -     X-ray foot left 3+ views;  Future    Arthritis of left ankle  -     Ambulatory Referral to Podiatry     - I believe that the vast majority of his pain is likely due to his tibiotalar ankle arthritis  -he is having no pain on exam overlying the lateral ligaments, even though these are ruptured they are not likely acutely ruptured and have likely been this way for some time causing some issues to his ankle  -we discussed and opted for an injection of the left ankle and following this she noticed immediate relief and was able to weight bear pain free  -he does notice that when wearing figure 8 brace his pain does improve and without this brace he does have more significant pain   -we discussed that his best nonsurgical option would be an [place] brace which she will consider in the future   -if the injection is only a femoral, he may benefit from ankle scope and referral to [facility] for continued care  -x-rays three views weight-bearing were taken and reviewed of the right foot and show significant spurring of the right ankle and arthritis, no other acute osseous abnormality    Medium joint arthrocentesis: R ankle  Universal Protocol:  Consent: Verbal consent obtained  Consent given by: patient  Timeout called at: 6/13/2022 10:43 AM   Patient understanding: patient states understanding of the procedure being performed  Patient consent: the patient's understanding of the procedure matches consent given  Patient identity confirmed: verbally with patient    Supporting Documentation  Indications: pain, joint swelling and diagnostic evaluation   Procedure Details  Location: ankle - R ankle  Ultrasound guidance: no  Approach: anterolateral    Patient tolerance: patient tolerated the procedure well with no immediate complications  Dressing:  Sterile dressing applied        History of Present Illness   Julian Kovacs is a 65 y o  male who presents with left ankle pain for a very long duration, he has had multiple significant sprains of the left ankle in the past   He did go to physical therapy for about 6 weeks and notes that his pain has continued it is somewhat significant  Notes significant pain at as the day goes on and notices increased pain on uneven surfaces did have an MRI done    Review of Systems   Constitutional: Negative for chills and fever  HENT: Negative for ear pain and sore throat  Eyes: Negative for pain and visual disturbance  Respiratory: Negative for cough and shortness of breath  Cardiovascular: Negative for chest pain and palpitations  Gastrointestinal: Negative for abdominal pain and vomiting  Genitourinary: Negative for dysuria and hematuria  Musculoskeletal: Negative for arthralgias and back pain  Skin: Negative for color change and rash  Neurological: Negative for seizures and syncope  All other systems reviewed and are negative        Historical Information   Past Medical History:   Diagnosis Date    Diabetes mellitus ([address] )     History of sleeve gastrectomy     Hypertension     Left ankle swelling     states thinks he sprained- swelling x 2 wks - surgeon office notified    Left shoulder pain     Mild acid reflux     Neuropathy     in feet yao    Shoulder impingement, left      Past Surgical History:   Procedure Laterality Date    GASTRECTOMY SLEEVE LAPAROSCOPIC      LAPAROSCOPIC GASTRIC BANDING       Social History   Social History     Substance and Sexual Activity   Alcohol Use Not Currently    Comment: rarely     Social History     Substance and Sexual Activity   Drug Use Never     Social History     Tobacco Use   Smoking Status Never Smoker   Smokeless Tobacco Never Used     Family History: non-contributory    Meds/Allergies   all medications and allergies reviewed  No Known Allergies    Objective   Vitals: Height 5' 9" (1 753 m), weight 117 kg (258 lb)  ,Body mass index is 38 1 kg/m²  Physical Exam  Vitals reviewed  Constitutional:       Appearance: Normal appearance  He is obese  HENT:      Head: Normocephalic and atraumatic  Nose: Nose normal       Mouth/Throat:      Mouth: Mucous membranes are moist    Eyes:      Pupils: Pupils are equal, round, and reactive to light  Cardiovascular:      Pulses: Normal pulses  Pulmonary:      Effort: Pulmonary effort is normal    Abdominal:      General: Abdomen is flat  Musculoskeletal:         General: Swelling, tenderness and deformity present  Comments: There is some tenderness to palpation in range of motion, there is significant tenderness to palpation along the anterior aspect of the ankle, there is limitation of dorsiflexion range of motion  There is some mild crepitus within the range of motion of the ankle  Skin:     Capillary Refill: Capillary refill takes less than 2 seconds  Neurological:      General: No focal deficit present  Mental Status: He is alert and oriented to person, place, and time     Psychiatric:         Mood and Affect: Mood normal          Ortho Exam

## 2022-06-07 NOTE — LETTER
June 13, 2022     Donovan Gutierrez MD  [address] [id number]    Patient: Bev Rowley   YOB: 1965   Date of Visit: 6/7/2022       Dear Dr Concepcion Marker: Thank you for referring Monserrat Godfrey to me for evaluation  Below are my notes for this consultation  If you have questions, please do not hesitate to call me  I look forward to following your patient along with you  Sincerely,        Zenaida Martinez DPM        CC: No Recipients  Segundo Yao  6/13/2022 10:46 AM  Signed      HISTORY AND PHYSICAL EXAM  - [facility]    PATIENT:  Bev Rowley    1965      Assessment/Plan     Problem List Items Addressed This Visit    None     Visit Diagnoses     Sprain of anterior talofibular ligament of left ankle, subsequent encounter        Tear of peroneal tendon of left foot        Injury of left ankle, subsequent encounter        Relevant Orders    X-ray foot left 3+ views (Completed)    Arthritis of left ankle               Diagnoses and all orders for this visit:    Sprain of anterior talofibular ligament of left ankle, subsequent encounter  -     Ambulatory Referral to Podiatry    Tear of peroneal tendon of left foot  -     Ambulatory Referral to Podiatry    Injury of left ankle, subsequent encounter  -     Ambulatory Referral to Podiatry  -     X-ray foot left 3+ views;  Future    Arthritis of left ankle  -     Ambulatory Referral to Podiatry     - I believe that the vast majority of his pain is likely due to his tibiotalar ankle arthritis  -he is having no pain on exam overlying the lateral ligaments, even though these are ruptured they are not likely acutely ruptured and have likely been this way for some time causing some issues to his ankle  -we discussed and opted for an injection of the left ankle and following this she noticed immediate relief and was able to weight bear pain free  -he does notice that when wearing figure 8 brace his pain does improve and without this brace he does have more significant pain   -we discussed that his best nonsurgical option would be an [place] brace which she will consider in the future   -if the injection is only a femoral, he may benefit from ankle scope and referral to [facility] for continued care  -x-rays three views weight-bearing were taken and reviewed of the right foot and show significant spurring of the right ankle and arthritis, no other acute osseous abnormality    Medium joint arthrocentesis: R ankle  Universal Protocol:  Consent: Verbal consent obtained  Consent given by: patient  Timeout called at: 6/13/2022 10:43 AM   Patient understanding: patient states understanding of the procedure being performed  Patient consent: the patient's understanding of the procedure matches consent given  Patient identity confirmed: verbally with patient    Supporting Documentation  Indications: pain, joint swelling and diagnostic evaluation   Procedure Details  Location: ankle - R ankle  Ultrasound guidance: no  Approach: anterolateral    Patient tolerance: patient tolerated the procedure well with no immediate complications  Dressing:  Sterile dressing applied        History of Present Illness   David Rand is a 65 y o  male who presents with left ankle pain for a very long duration, he has had multiple significant sprains of the left ankle in the past   He did go to physical therapy for about 6 weeks and notes that his pain has continued it is somewhat significant  Notes significant pain at as the day goes on and notices increased pain on uneven surfaces did have an MRI done    Review of Systems   Constitutional: Negative for chills and fever  HENT: Negative for ear pain and sore throat  Eyes: Negative for pain and visual disturbance  Respiratory: Negative for cough and shortness of breath  Cardiovascular: Negative for chest pain and palpitations     Gastrointestinal: Negative for abdominal pain and vomiting  Genitourinary: Negative for dysuria and hematuria  Musculoskeletal: Negative for arthralgias and back pain  Skin: Negative for color change and rash  Neurological: Negative for seizures and syncope  All other systems reviewed and are negative  Historical Information   Past Medical History:   Diagnosis Date    Diabetes mellitus ([address] )     History of sleeve gastrectomy     Hypertension     Left ankle swelling     states thinks he sprained- swelling x 2 wks - surgeon office notified    Left shoulder pain     Mild acid reflux     Neuropathy     in feet yao    Shoulder impingement, left      Past Surgical History:   Procedure Laterality Date    GASTRECTOMY SLEEVE LAPAROSCOPIC      LAPAROSCOPIC GASTRIC BANDING       Social History   Social History     Substance and Sexual Activity   Alcohol Use Not Currently    Comment: rarely     Social History     Substance and Sexual Activity   Drug Use Never     Social History     Tobacco Use   Smoking Status Never Smoker   Smokeless Tobacco Never Used     Family History: non-contributory    Meds/Allergies   all medications and allergies reviewed  No Known Allergies    Objective   Vitals: Height 5' 9" (1 753 m), weight 117 kg (258 lb)  ,Body mass index is 38 1 kg/m²  Physical Exam  Vitals reviewed  Constitutional:       Appearance: Normal appearance  He is obese  HENT:      Head: Normocephalic and atraumatic  Nose: Nose normal       Mouth/Throat:      Mouth: Mucous membranes are moist    Eyes:      Pupils: Pupils are equal, round, and reactive to light  Cardiovascular:      Pulses: Normal pulses  Pulmonary:      Effort: Pulmonary effort is normal    Abdominal:      General: Abdomen is flat  Musculoskeletal:         General: Swelling, tenderness and deformity present  Comments:  There is some tenderness to palpation in range of motion, there is significant tenderness to palpation along the anterior aspect of the ankle, there is limitation of dorsiflexion range of motion  There is some mild crepitus within the range of motion of the ankle  Skin:     Capillary Refill: Capillary refill takes less than 2 seconds  Neurological:      General: No focal deficit present  Mental Status: He is alert and oriented to person, place, and time     Psychiatric:         Mood and Affect: Mood normal          Ortho Exam

## 2022-06-13 RX ORDER — DEXAMETHASONE SODIUM PHOSPHATE 4 MG/ML
2 INJECTION, SOLUTION INTRA-ARTICULAR; INTRALESIONAL; INTRAMUSCULAR; INTRAVENOUS; SOFT TISSUE ONCE
Status: SHIPPED | OUTPATIENT
Start: 2022-06-13

## 2022-06-13 RX ORDER — LIDOCAINE HYDROCHLORIDE 10 MG/ML
1.5 INJECTION, SOLUTION EPIDURAL; INFILTRATION; INTRACAUDAL; PERINEURAL ONCE
Status: SHIPPED | OUTPATIENT
Start: 2022-06-13

## 2022-07-11 ENCOUNTER — OFFICE VISIT (OUTPATIENT)
Dept: PODIATRY | Facility: CLINIC | Age: 57
End: 2022-07-11
Payer: COMMERCIAL

## 2022-07-11 VITALS — HEIGHT: 69 IN | BODY MASS INDEX: 38.21 KG/M2 | WEIGHT: 258 LBS

## 2022-07-11 DIAGNOSIS — M19.072 ARTHRITIS OF LEFT ANKLE: Primary | ICD-10-CM

## 2022-07-11 PROCEDURE — 99213 OFFICE O/P EST LOW 20 MIN: CPT | Performed by: PODIATRIST

## 2022-07-11 NOTE — PROGRESS NOTES
Assessment/Plan:    No problem-specific Assessment & Plan notes found for this encounter  Diagnoses and all orders for this visit:    Arthritis of left ankle  -     Ambulatory Referral to Orthopedic Surgery; Future  -     Brace      -he had good relief with 1 injection, Rx given for [place] brace   -referred to Dr Rony Jean for possible ankle scope versus ankle replacement  -he understands the difference between conservative and surgical care, and I discussed with him if this does interfere with his activities of daily living should return to clinic for another injection    Subjective:      Patient ID: Africa Haque is a 65 y o  male  Patient presents for evaluation management of left ankle arthritis, he did have an injection approximately 1 5 months ago  States that within the 1st 48 hours he had noticed much relief but over the past month the steroids have kicked in and his significantly better ambulation than prior  Follow-up with the about 60% pain reduction but notices continued pain and swelling after an active day      The following portions of the patient's history were reviewed and updated as appropriate: allergies, current medications, past family history, past medical history, past social history, past surgical history and problem list     Review of Systems   Constitutional: Negative for chills and fever  HENT: Negative for ear pain and sore throat  Eyes: Negative for pain and visual disturbance  Respiratory: Negative for cough and shortness of breath  Cardiovascular: Negative for chest pain and palpitations  Gastrointestinal: Negative for abdominal pain and vomiting  Genitourinary: Negative for dysuria and hematuria  Musculoskeletal: Negative for arthralgias and back pain  Skin: Negative for color change and rash  Neurological: Negative for seizures and syncope  All other systems reviewed and are negative          Objective:      Ht 5' 9" (1 753 m)   Wt 117 kg (258 lb) BMI 38 10 kg/m²          Physical Exam  Vitals reviewed  Constitutional:       Appearance: Normal appearance  HENT:      Head: Normocephalic  Nose: Nose normal       Mouth/Throat:      Mouth: Mucous membranes are moist    Eyes:      Pupils: Pupils are equal, round, and reactive to light  Pulmonary:      Effort: Pulmonary effort is normal    Musculoskeletal:         General: Swelling and tenderness present  Comments: There is continued pain with range of motion sagittal plane of the left ankle, there is crepitus with range of motion, he does have some swelling of the left ankle when compared to the contralateral ankle  Decreased range of motion overall the left ankle   Skin:     Capillary Refill: Capillary refill takes 2 to 3 seconds  Neurological:      General: No focal deficit present  Mental Status: He is alert and oriented to person, place, and time

## 2022-08-31 ENCOUNTER — OFFICE VISIT (OUTPATIENT)
Dept: OBGYN CLINIC | Facility: CLINIC | Age: 57
End: 2022-08-31
Payer: COMMERCIAL

## 2022-08-31 ENCOUNTER — APPOINTMENT (OUTPATIENT)
Dept: RADIOLOGY | Facility: AMBULARY SURGERY CENTER | Age: 57
End: 2022-08-31
Attending: ORTHOPAEDIC SURGERY
Payer: COMMERCIAL

## 2022-08-31 VITALS — SYSTOLIC BLOOD PRESSURE: 112 MMHG | DIASTOLIC BLOOD PRESSURE: 76 MMHG | HEART RATE: 70 BPM

## 2022-08-31 DIAGNOSIS — M21.6X2 CAVOVARUS DEFORMITY OF FOOT, ACQUIRED, LEFT: Primary | ICD-10-CM

## 2022-08-31 DIAGNOSIS — M19.172 POST-TRAUMATIC OSTEOARTHRITIS OF LEFT ANKLE: ICD-10-CM

## 2022-08-31 DIAGNOSIS — M19.072 ARTHRITIS OF LEFT ANKLE: ICD-10-CM

## 2022-08-31 PROCEDURE — 99243 OFF/OP CNSLTJ NEW/EST LOW 30: CPT | Performed by: ORTHOPAEDIC SURGERY

## 2022-08-31 PROCEDURE — 73610 X-RAY EXAM OF ANKLE: CPT

## 2022-08-31 NOTE — PATIENT INSTRUCTIONS
Today, We recommended a brace/prosthesis for you  [facility] certified pedorthotists make orthotics but not braces or prosthesis  Below are the companies in the area that make these, Please call ahead for an appointment and to ensure the company accepts your insurance  Make sure to bring the prescription given to you in the office today to the company for the brace/prosthesis  [place]  [address]  [address] [facility] [address] Phone: [phone]  [address] [facility] Fax: [phone]    [address]  [address]  [place], [address] [place] [address] Ave [address]  (By Appointment Only)  Directions  [address] [facility], [address] [place] [address] [address]  [place] Phone: [phone]  [place] Fax: 210 [facility] Location  [address]  [phone] (fax)    [facility]  [address] [facility], [address] [place] [address],[address]  [place], [facility]  [phone] (fax)    [facility]  [address]  [phone] (fax)    [facility] Location  [address], [address] [address]  [phone] (fax)    [facility]  [address] , [address], [address]ilio [address]im[address]  [phone] (fax)

## 2022-08-31 NOTE — PROGRESS NOTES
SMITHA Pennington  Attending, Orthopaedic Surgery  [facility] and [address] [facility] [address] [facility]      ORTHOPAEDIC FOOT AND ANKLE CLINIC VISIT     Assessment:     Encounter Diagnoses   Name Primary?  Cavovarus deformity of foot, acquired, left Yes    Post-traumatic osteoarthritis of left ankle             Plan:   · The patient verbalized understanding of exam findings and treatment plan  We engaged in the shared decision-making process and treatment options were discussed at length with the patient  Surgical and conservative management discussed today along with risks and benefits  · Vijay Shelton has a cavovarus deformity of his foot/heel which makes him more prone to inversion ankle injuries with end stage osteoarthritis  · Explained to the patient that as his most recent A1c was 8 5, elective surgery is not recommended and as he has diabetic neuropathy a TAA is not recommended either  The surgical procedure that would be considered would be a Malerba calcaneus osteotomy, dorsiflexion 1st Ray osteotomy, Peroneal tendon transfer of longus to brevis with ankle fusion  · At this time, the patient was instructed to obtain the [address] as prescribed by Dr Liborio Hutchison, brace info was provided for the patient today and lower his A1c  · We usually see patient back 1 month after he has had his [place] brace to re evaluate results and make further treatment options based on these results  He understood and all questions were answered  Return if symptoms worsen or fail to improve  History of Present Illness:   Chief Complaint:   Chief Complaint   Patient presents with    Left Ankle - Pain     Josselin Arce is a 65 y o  male who is being seen for left ankle pain  Patient reports a work injury that occurred on 1/10/2022 after an inversion injury to the ankle  He treated with a CAM boot and PT without much benefit   He recently saw Dr Liborio Hutchison who recommended an [address] as he is an uncontrolled diabetic and a surgical procedure is not recommended  Pain is localized at anterolateral ankle with minimal radiating and described as sharp and severe  Patient denies numbness, tingling or radicular pain  Denies history of neuropathy  Patient does not smoke, does have diabetes and does not take blood thinners  Patient denies family history of anesthesia complications and has not had any complications with anesthesia  Pain/symptom timing:  Worse during the day when active  Pain/symptom context:  Worse with activites and work  Pain/symptom modifying factors:  Rest makes better, activities make worse  Pain/symptom associated signs/symptoms: none    Prior treatment   · NSAIDsNo   · Injections No   · Bracing/Orthotics Yes    · Physical Therapy Yes     Orthopedic Surgical History:   See below    Past Medical, Surgical and Social History:  Past Medical History:  has a past medical history of Diabetes mellitus ([facility] [address] ), History of sleeve gastrectomy, Hypertension, Left ankle swelling, Left shoulder pain, Mild acid reflux, Neuropathy, and Shoulder impingement, left  Problem List: does not have any pertinent problems on file  Past Surgical History:  has a past surgical history that includes GASTRECTOMY SLEEVE LAPAROSCOPIC and Laparoscopic gastric banding  Family History: family history is not on file  Social History:  reports that he has never smoked  He has never used smokeless tobacco  He reports previous alcohol use  He reports that he does not use drugs  Current Medications: has a current medication list which includes the following prescription(s): insulin glargine, lisinopril, metformin, pioglitazone, and simvastatin, and the following Facility-Administered Medications: dexamethasone and lidocaine (pf)  Allergies: has No Known Allergies       Review of Systems:  General- denies fever/chills  HEENT- denies hearing loss or sore throat  Eyes- denies eye pain or visual disturbances, denies red eyes  Respiratory- denies cough or SOB  Cardio- denies chest pain or palpitations  GI- denies abdominal pain  Endocrine- denies urinary frequency  Urinary- denies pain with urination  Musculoskeletal- Negative except noted above  Skin- denies rashes or wounds  Neurological- denies dizziness or headache  Psychiatric- denies anxiety or difficulty concentrating    Physical Exam:   /76   Pulse 70   General/Constitutional: No apparent distress: well-nourished and well developed  Eyes: normal ocular motion  Cardio: RRR, Normal S1S2, No m/r/g  Lymphatic: No appreciable lymphadenopathy  Respiratory: Non-labored breathing, CTA b/l no w/c/r  Vascular: No edema, swelling or tenderness, except as noted in detailed exam   Integumentary: No impressive skin lesions present, except as noted in detailed exam   Neuro: No ataxia or tremors noted  Psych: Normal mood and affect, oriented to person, place and time  Appropriate affect  Musculoskeletal: Normal, except as noted in detailed exam and in HPI  Examination    Left    Gait Normal   Musculoskeletal Tender to palpation at ATFL    Skin Normal       Nails Normal    Range of Motion  0 degrees dorsiflexion, 25 degrees plantarflexion  Subtalar motion: normal    Stability Stable    Muscle Strength 5/5 tibialis anterior  5/5 gastrocnemius-soleus  5/5 posterior tibialis  5/5 peroneal/eversion strength  5/5 EHL  5/5 FHL    Neurologic Normal    Sensation Intact to light touch throughout sural, saphenous, superficial peroneal, deep peroneal and medial/lateral plantar nerve distributions  Willard-Claritza 5 07 filament (10g) testing deferred  Cardiovascular Brisk capillary refill < 2 seconds,intact DP and PT pulses    Special Tests None      Imaging Studies:   3 views of the left ankle were taken, reviewed and interpreted independently that demonstrate cavovarus deformity with end stage tibiotalar joint arthritis  Reviewed by me personally  Authur Distel Lachman, MD  Foot & Ankle Surgery Department of [address] [place] [address]      I personally performed the service  Christyne Kemps Lachman, MD    Scribe Attestation    I,:  Janes Cantu am acting as a scribe while in the presence of the attending physician :       I,:  Keenan Kendall MD personally performed the services described in this documentation    as scribed in my presence :

## 2022-09-08 ENCOUNTER — TELEPHONE (OUTPATIENT)
Dept: OBGYN CLINIC | Facility: HOSPITAL | Age: 57
End: 2022-09-08

## 2022-09-08 NOTE — TELEPHONE ENCOUNTER
Patient sees Dr Hua Schmitt      Patients sister in law ALIZE is calling to find out for patient when his surgery surgery is  She is not listed on the consent form so no information was provided to her        If someone can please reach out to the patient to let him know when his surgery is scheduled for      CB: [phone]

## 2022-09-09 ENCOUNTER — TELEPHONE (OUTPATIENT)
Dept: OBGYN CLINIC | Facility: CLINIC | Age: 57
End: 2022-09-09

## 2022-09-09 DIAGNOSIS — Z01.812 PRE-OPERATIVE LABORATORY EXAMINATION: Primary | ICD-10-CM

## 2022-09-09 DIAGNOSIS — M75.42 IMPINGEMENT SYNDROME OF LEFT SHOULDER: ICD-10-CM

## 2022-09-22 ENCOUNTER — APPOINTMENT (OUTPATIENT)
Dept: LAB | Facility: CLINIC | Age: 57
End: 2022-09-22
Payer: COMMERCIAL

## 2022-09-22 DIAGNOSIS — M75.42 IMPINGEMENT SYNDROME OF LEFT SHOULDER: ICD-10-CM

## 2022-09-22 DIAGNOSIS — Z01.812 PRE-OPERATIVE LABORATORY EXAMINATION: ICD-10-CM

## 2022-09-22 LAB
ANION GAP SERPL CALCULATED.3IONS-SCNC: 6 MMOL/L (ref 4–13)
BASOPHILS # BLD AUTO: 0.08 THOUSANDS/ΜL (ref 0–0.1)
BASOPHILS NFR BLD AUTO: 1 % (ref 0–1)
BUN SERPL-MCNC: 21 MG/DL (ref 5–25)
CALCIUM SERPL-MCNC: 9.3 MG/DL (ref 8.3–10.1)
CHLORIDE SERPL-SCNC: 105 MMOL/L (ref 96–108)
CO2 SERPL-SCNC: 27 MMOL/L (ref 21–32)
CREAT SERPL-MCNC: 1.43 MG/DL (ref 0.6–1.3)
EOSINOPHIL # BLD AUTO: 0.26 THOUSAND/ΜL (ref 0–0.61)
EOSINOPHIL NFR BLD AUTO: 4 % (ref 0–6)
ERYTHROCYTE [DISTWIDTH] IN BLOOD BY AUTOMATED COUNT: 13.2 % (ref 11.6–15.1)
EST. AVERAGE GLUCOSE BLD GHB EST-MCNC: 220 MG/DL
GFR SERPL CREATININE-BSD FRML MDRD: 54 ML/MIN/1.73SQ M
GLUCOSE P FAST SERPL-MCNC: 147 MG/DL (ref 65–99)
HBA1C MFR BLD: 9.3 %
HCT VFR BLD AUTO: 42.5 % (ref 36.5–49.3)
HGB BLD-MCNC: 14 G/DL (ref 12–17)
IMM GRANULOCYTES # BLD AUTO: 0.03 THOUSAND/UL (ref 0–0.2)
IMM GRANULOCYTES NFR BLD AUTO: 0 % (ref 0–2)
LYMPHOCYTES # BLD AUTO: 1.75 THOUSANDS/ΜL (ref 0.6–4.47)
LYMPHOCYTES NFR BLD AUTO: 25 % (ref 14–44)
MCH RBC QN AUTO: 27.5 PG (ref 26.8–34.3)
MCHC RBC AUTO-ENTMCNC: 32.9 G/DL (ref 31.4–37.4)
MCV RBC AUTO: 84 FL (ref 82–98)
MONOCYTES # BLD AUTO: 0.47 THOUSAND/ΜL (ref 0.17–1.22)
MONOCYTES NFR BLD AUTO: 7 % (ref 4–12)
NEUTROPHILS # BLD AUTO: 4.31 THOUSANDS/ΜL (ref 1.85–7.62)
NEUTS SEG NFR BLD AUTO: 63 % (ref 43–75)
NRBC BLD AUTO-RTO: 0 /100 WBCS
PLATELET # BLD AUTO: 275 THOUSANDS/UL (ref 149–390)
PMV BLD AUTO: 10.7 FL (ref 8.9–12.7)
POTASSIUM SERPL-SCNC: 4.3 MMOL/L (ref 3.5–5.3)
RBC # BLD AUTO: 5.09 MILLION/UL (ref 3.88–5.62)
SODIUM SERPL-SCNC: 138 MMOL/L (ref 135–147)
WBC # BLD AUTO: 6.9 THOUSAND/UL (ref 4.31–10.16)

## 2022-09-22 PROCEDURE — 80048 BASIC METABOLIC PNL TOTAL CA: CPT

## 2022-09-22 PROCEDURE — 85025 COMPLETE CBC W/AUTO DIFF WBC: CPT

## 2022-09-22 PROCEDURE — 83036 HEMOGLOBIN GLYCOSYLATED A1C: CPT

## 2022-09-22 PROCEDURE — 36415 COLL VENOUS BLD VENIPUNCTURE: CPT

## 2022-09-23 ENCOUNTER — TELEPHONE (OUTPATIENT)
Dept: OBGYN CLINIC | Facility: CLINIC | Age: 57
End: 2022-09-23

## 2023-02-20 ENCOUNTER — HOSPITAL ENCOUNTER (EMERGENCY)
Facility: HOSPITAL | Age: 58
Discharge: HOME | End: 2023-02-20
Attending: FAMILY MEDICINE | Admitting: FAMILY MEDICINE

## 2023-02-20 VITALS
BODY MASS INDEX: 35.15 KG/M2 | RESPIRATION RATE: 18 BRPM | HEART RATE: 66 BPM | TEMPERATURE: 97.7 F | DIASTOLIC BLOOD PRESSURE: 78 MMHG | OXYGEN SATURATION: 97 % | WEIGHT: 238 LBS | SYSTOLIC BLOOD PRESSURE: 134 MMHG

## 2023-02-20 DIAGNOSIS — M25.512 SHOULDER PAIN, LEFT: Primary | ICD-10-CM
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
## 2023-02-23 ENCOUNTER — EVALUATION (OUTPATIENT)
Dept: PHYSICAL THERAPY | Facility: CLINIC | Age: 58
End: 2023-02-23

## 2023-02-23 DIAGNOSIS — M25.512 SHOULDER PAIN, LEFT: ICD-10-CM

## 2023-02-23 DIAGNOSIS — M25.512 ACUTE PAIN OF LEFT SHOULDER: Primary | ICD-10-CM

## 2023-03-01 ENCOUNTER — OFFICE VISIT (OUTPATIENT)
Dept: PHYSICAL THERAPY | Facility: CLINIC | Age: 58
End: 2023-03-01

## 2023-03-01 DIAGNOSIS — M25.512 ACUTE PAIN OF LEFT SHOULDER: Primary | ICD-10-CM

## 2023-03-01 NOTE — PROGRESS NOTES
Daily Note     Today's date: 3/1/2023  Patient name: Gissel Rodriguez  : 1965  MRN: [id number]  Referring provider: Lb Lugo MD  Dx:   Encounter Diagnosis     ICD-10-CM    1  Acute pain of left shoulder  M25 512                      Subjective: Pt states his shld is feeling good today , but that was not the case 2 days ago  Pt had pain with wall angels but feels it is related to bone spur  Objective: See treatment diary below      Assessment: Tolerated treatment well  Patient demonstrated fatigue post treatment, exhibited good technique with therapeutic exercises and would benefit from continued PT  Pain level elevated after tx, however pt had no diff with any ex  Plan: Continue per plan of care        Precautions: DM , HTN    Re-eval Date: 3/23/23    Date 2/23 3/1      Visit Count 1 2      FOTO Comp        Pain In See eval 3/10      Pain Out See eval             Manuals 2/23 3/1                                      Neuro Re-Ed        T-band  Flex, IR, ER  MTP/LTPs  Green  20x ea      Wall push ups plus   2 x 10      Ball on wall  Red med ball  20x 4dir      Wall angels  20x                              Ther Ex        UBE  L 2 0  10'  Alt every 2 min      Flex/abd w/DB  2#  2x10 ea       press  3#  3 x 10      Chest press  8#  3 x 10      Crate lift floor to plinth  15#  20x      Prone I,T,Ys  2 x 10 ea      Windshield wipers   Green 2 x 10      90-90 arm lift at wall  2 x 10      Ther Activity                        Gait Training                        Modalities

## 2023-03-03 ENCOUNTER — APPOINTMENT (OUTPATIENT)
Dept: PHYSICAL THERAPY | Facility: CLINIC | Age: 58
End: 2023-03-03

## 2023-03-03 NOTE — PROGRESS NOTES
Daily Note     Today's date: 3/3/2023  Patient name: Owen Eckert  : 1965  MRN: [id number]  Referring provider: Mark Delcid MD  Dx:   Encounter Diagnosis     ICD-10-CM    1   Acute pain of left shoulder  M25 512                      Subjective:       Objective: See treatment diary below      Assessment: Tolerated treatment       Plan:              Precautions: DM , HTN    Re-eval Date: 3/23/23    Date 2/23 3/1 3 3 23     Visit Count 1 2 3     FOTO Comp        Pain In See eval 3/10      Pain Out See eval             Manuals 2/23 3/1 3 3 23                                     Neuro Re-Ed   3 3 23     T-band  Flex, IR, ER  MTP/LTPs  Green  20x ea Green  20x ea     Wall push ups plus   2 x 10  2 x 10     Ball on wall  Red med ball  20x 4dir Red med ball  20x 4dir     Wall angels  20x 20x                               Ther Ex   3 3 23     UBE  L 2 0  10'  Alt every 2 min L 2 0  10'  Alt every 2 min     Flex/abd w/DB  2#  2x10 ea 2#  2x10 ea      press  3#  3 x 10 3#  3 x 10       Chest press  8#  3 x 10 8#  3 x 10       Crate lift floor to plinth  15#  20x 15#  20x     Prone I,T,Ys  2 x 10 ea 2 x 10 ea       Windshield wipers   Green 2 x 10 Green 2 x 10     90-90 arm lift at wall  2 x 10 2 x 10     Ther Activity                        Gait Training                        Modalities

## 2023-03-07 ENCOUNTER — APPOINTMENT (OUTPATIENT)
Dept: PHYSICAL THERAPY | Facility: CLINIC | Age: 58
End: 2023-03-07

## 2023-03-10 ENCOUNTER — APPOINTMENT (OUTPATIENT)
Dept: PHYSICAL THERAPY | Facility: CLINIC | Age: 58
End: 2023-03-10